# Patient Record
Sex: FEMALE | Race: WHITE | NOT HISPANIC OR LATINO | Employment: FULL TIME | URBAN - METROPOLITAN AREA
[De-identification: names, ages, dates, MRNs, and addresses within clinical notes are randomized per-mention and may not be internally consistent; named-entity substitution may affect disease eponyms.]

---

## 2015-04-30 LAB — HIV 1&2 EXT: NORMAL

## 2021-06-04 ENCOUNTER — IMMUNIZATION (OUTPATIENT)
Dept: LAB | Facility: CLINIC | Age: 56
End: 2021-06-04

## 2022-01-25 ENCOUNTER — TRANSFERRED RECORDS (OUTPATIENT)
Dept: MULTI SPECIALTY CLINIC | Facility: CLINIC | Age: 57
End: 2022-01-25

## 2022-01-25 LAB
C TRACH DNA SPEC QL PROBE+SIG AMP: NEGATIVE
CHOLESTEROL (EXTERNAL): 274 MG/DL (ref 100–199)
GLUCOSE (EXTERNAL): 121 MG/DL (ref 65–100)
HDLC SERPL-MCNC: 51 MG/DL
HPV ABSTRACT: NORMAL
LDL CHOLESTEROL (EXTERNAL): 180 MG/DL
N GONORRHOEA DNA SPEC QL PROBE+SIG AMP: NEGATIVE
NON HDL CHOLESTEROL (EXTERNAL): 223 MG/DL
PAP-ABSTRACT: NORMAL
SPECIMEN DESCRIP: NORMAL
SPECIMEN DESCRIPTION: NORMAL
TRIGLYCERIDES (EXTERNAL): 213 MG/DL

## 2022-02-01 ENCOUNTER — OFFICE VISIT (OUTPATIENT)
Dept: DERMATOLOGY | Facility: CLINIC | Age: 57
End: 2022-02-01
Payer: COMMERCIAL

## 2022-02-01 VITALS — DIASTOLIC BLOOD PRESSURE: 88 MMHG | OXYGEN SATURATION: 94 % | SYSTOLIC BLOOD PRESSURE: 129 MMHG | HEART RATE: 93 BPM

## 2022-02-01 DIAGNOSIS — L92.0 GRANULOMA ANNULARE: ICD-10-CM

## 2022-02-01 DIAGNOSIS — D18.01 CHERRY ANGIOMA: ICD-10-CM

## 2022-02-01 DIAGNOSIS — L82.1 SEBORRHEIC KERATOSIS: ICD-10-CM

## 2022-02-01 DIAGNOSIS — D48.5 NEOPLASM OF UNCERTAIN BEHAVIOR OF SKIN: ICD-10-CM

## 2022-02-01 DIAGNOSIS — D17.21 LIPOMA OF RIGHT UPPER EXTREMITY: ICD-10-CM

## 2022-02-01 DIAGNOSIS — L81.4 LENTIGO: Primary | ICD-10-CM

## 2022-02-01 DIAGNOSIS — D22.9 MULTIPLE BENIGN NEVI: ICD-10-CM

## 2022-02-01 PROCEDURE — 99203 OFFICE O/P NEW LOW 30 MIN: CPT | Mod: 25 | Performed by: PHYSICIAN ASSISTANT

## 2022-02-01 PROCEDURE — 11103 TANGNTL BX SKIN EA SEP/ADDL: CPT | Performed by: PHYSICIAN ASSISTANT

## 2022-02-01 PROCEDURE — 11102 TANGNTL BX SKIN SINGLE LES: CPT | Performed by: PHYSICIAN ASSISTANT

## 2022-02-01 PROCEDURE — 88305 TISSUE EXAM BY PATHOLOGIST: CPT | Performed by: DERMATOLOGY

## 2022-02-01 RX ORDER — METRONIDAZOLE 500 MG/1
500 TABLET ORAL
COMMUNITY
Start: 2022-01-27 | End: 2022-02-03

## 2022-02-01 RX ORDER — HYDROCODONE BITARTRATE AND ACETAMINOPHEN 5; 325 MG/1; MG/1
TABLET ORAL
COMMUNITY
Start: 2021-11-24 | End: 2022-12-21

## 2022-02-01 RX ORDER — SUMATRIPTAN 100 MG/1
TABLET, FILM COATED ORAL
COMMUNITY
Start: 2022-01-21

## 2022-02-01 RX ORDER — GABAPENTIN 100 MG/1
1 CAPSULE ORAL AT BEDTIME
COMMUNITY
Start: 2021-12-12 | End: 2022-12-21

## 2022-02-01 RX ORDER — MELATONIN 10 MG
CAPSULE ORAL AT BEDTIME
COMMUNITY
Start: 2021-04-27 | End: 2022-09-20

## 2022-02-01 NOTE — NURSING NOTE
Chief Complaint   Patient presents with     Skin Check     spot on Left dorsal foot       Vitals:    02/01/22 1149   BP: 129/88   BP Location: Right arm   Patient Position: Sitting   Cuff Size: Adult Large   Pulse: 93   SpO2: 94%     Wt Readings from Last 1 Encounters:   09/16/13 (!) 142.4 kg (314 lb)       Leticia Garcia LPN .................2/1/2022

## 2022-02-01 NOTE — PROGRESS NOTES
Ruthy Hugo is an extremely pleasant 56 year old year old female patient here today for spot on right shoulder. Present this past year. No pain or bleeding. She also notes itchy spot on upper back.  Patient has no other skin complaints today.  Remainder of the HPI, Meds, PMH, Allergies, FH, and SH was reviewed in chart.    Pertinent Hx:   No personal history of skin cancer.   History reviewed. No pertinent past medical history.    Past Surgical History:   Procedure Laterality Date     ARTHROPLASTY KNEE  9/16/2013    Procedure: ARTHROPLASTY KNEE;  Right Total Knee Arthroplasty;  Surgeon: Gabe Hassan MD;  Location: [facility]     ARTHROPLASTY KNEE  11/4/2013    Procedure: ARTHROPLASTY KNEE;  Left total knee arthroplasty;  Surgeon: Gabe Hassan MD;  Location: [facility]        Family History   Problem Relation Age of Onset     Melanoma Maternal Grandfather        Social History     Socioeconomic History     Marital status:      Spouse name: Not on file     Number of children: Not on file     Years of education: Not on file     Highest education level: Not on file   Occupational History     Not on file   Tobacco Use     Smoking status: Never Smoker     Smokeless tobacco: Never Used   Substance and Sexual Activity     Alcohol use: Not on file     Drug use: Not on file     Sexual activity: Not on file   Other Topics Concern     Not on file   Social History Narrative     Not on file     Social Determinants of Health     Financial Resource Strain: Not on file   Food Insecurity: Not on file   Transportation Needs: Not on file   Physical Activity: Not on file   Stress: Not on file   Social Connections: Not on file   Intimate Partner Violence: Not on file   Housing Stability: Not on file       Outpatient Encounter Medications as of 2/1/2022   Medication Sig Dispense Refill     BuPROPion HCl (WELLBUTRIN SR PO) Take 300 mg by mouth every morning       BuPROPion HCl (WELLBUTRIN SR PO) Take 150 mg by mouth  every evening       Calcium Carbonate (CALCIUM 600 PO) Take 1 tablet by mouth 2 times daily        calcium carbonate (TUMS) 500 MG chewable tablet Take 1-2 chew tab by mouth as needed For heartburn/reflux       cimetidine (TAGAMET) 200 MG tablet Take by mouth as needed       clindamycin (CLEOCIN T) 1 % external solution Apply topically 2 times daily as needed        coenzyme Q-10 75 MG CAPS Patient reports taking 200 mg softgel       CYCLOBENZAPRINE HCL PO Take 10 mg by mouth 3 times daily as needed Take one tablet by mouth three times daily       DOCUSATE SODIUM PO Take 300 mg by mouth every evening       gabapentin (NEURONTIN) 100 MG capsule Take 1 capsule by mouth At Bedtime       GLUCOSAMINE SULFATE PO Take 1,500 mg by mouth 2 times daily for joints       HYDROcodone-acetaminophen (NORCO) 5-325 MG tablet TAKE 1-2 TABLETS BY MOUTH EVERY 6 HOURS AS NEEDED FOR PAIN Maximum of 2 per 24 hours       Melatonin 10 MG CAPS Take by mouth At Bedtime       metroNIDAZOLE (FLAGYL) 500 MG tablet Take 500 mg by mouth For 7 days       psyllium 0.52 G capsule Take 2 capsules by mouth 2 times daily       SIMVASTATIN PO Take 20 mg by mouth At Bedtime       SUMAtriptan (IMITREX) 100 MG tablet Take 0.5 Tablets (50 mg) by mouth one time if needed for Migraine. May repeat in 2 hrs. Maximum of 2 in 24 hours       DiphenhydrAMINE HCl (BENADRYL PO) Take 50 mg by mouth every morning  (Patient not taking: Reported on 2/1/2022)       ferrous sulfate 325 (65 FE) MG tablet Take 325 mg by mouth daily (with breakfast) (Patient not taking: Reported on 2/1/2022)       hydrOXYzine (ATARAX) 25 MG tablet Take 1-2 tablets (25-50 mg) by mouth every 6 hours as needed (adjuvant pain) (Patient not taking: Reported on 2/1/2022) 30 tablet 1     Multiple Vitamin (MULTI-VITAMIN DAILY PO) Take 1 tablet by mouth daily  (Patient not taking: Reported on 2/1/2022)       oxyCODONE-acetaminophen (PERCOCET) 5-325 MG per tablet Take 1-2 tablets by mouth every 4 hours  as needed (Patient not taking: Reported on 2/1/2022) 60 tablet 0     polyethylene glycol (MIRALAX/GLYCOLAX) packet Take 17 g by mouth daily (Patient not taking: Reported on 2/1/2022) 7 packet 0     Solifenacin Succinate (VESICARE PO) Take 10 mg by mouth every morning (Patient not taking: Reported on 2/1/2022)       warfarin (COUMADIN) 5 MG tablet Starting Friday, 11/8 take 1.5 tablets (7.5mg) daily. Adjust dosing as directed by clinic. (Patient not taking: Reported on 2/1/2022) 45 tablet 1     No facility-administered encounter medications on file as of 2/1/2022.             O:   NAD, WDWN, Alert & Oriented, Mood & Affect wnl, Vitals stable   Here today alone   /88 (BP Location: Right arm, Patient Position: Sitting, Cuff Size: Adult Large)   Pulse 93   SpO2 94%    General appearance normal   Vitals stable   Alert, oriented and in no acute distress     Soft subcutaneous nodule on right forearm, right posterior shoulder, left upper back  0.2 cm light brown papule with darker center on right posterior shoulder   0.4 cm pink scabbed papule on right upper back   Stuck on papules and brown macules on trunk and ext   Slightly annulare pink patch with center clearance on left dorsal foot   Red papules on trunk  Brown papules and macules with regular pigment network and borders on torso and extremities      The remainder of skin exam is normal     Eyes: Conjunctivae/lids:Normal     ENT: Lips: normal    MSK:Normal    Cardiovascular: peripheral edema none    Pulm: Breathing Normal    Neuro/Psych: Orientation:Alert and Orientedx3 ; Mood/Affect:normal     A/P:  1. R/O atypical nevus on right posterior shoulder  TANGENTIAL BIOPSY SENT OUT:  After consent, anesthesia with LEC and prep, tangential excision performed and specimen sent out for permanent section histology.  No complications and routine wound care. Patient told to call our office in 1-2 weeks for result.      2. R/O irritated nevus on right upper  back  TANGENTIAL BIOPSY SENT OUT:  After consent, anesthesia with LEC and prep, tangential excision performed and specimen sent out for permanent section histology.  No complications and routine wound care. Patient told to call our office in 1-2 weeks for result.      3. 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   4. Seborrheic keratosis, lentigo, angioma, benign nevi, lipoma   BENIGN LESIONS DISCUSSED WITH PATIENT:  I discussed the specifics of tumor, prognosis, and genetics of benign lesions.  I explained that treatment of these lesions would be purely cosmetic and not medically neccessary.  I discussed with patient different removal options including excision, cautery and /or laser.      Nature and genetics of benign skin lesions dicussed with patient.  Signs and Symptoms of skin cancer discussed with patient.  ABCDEs of melanoma reviewed with patient.  Patient encouraged to perform monthly skin exams.  UV precautions reviewed with patient.  Risks of non-melanoma skin cancer discussed with patient   Return to clinic pending biopsy results.

## 2022-02-01 NOTE — PATIENT INSTRUCTIONS
Wound Care Instructions     FOR SUPERFICIAL WOUNDS     AFTER 24 HOURS YOU SHOULD REMOVE THE BANDAGE AND BEGIN DAILY DRESSING CHANGES AS FOLLOWS:     1) Remove Dressing.     2) Clean and dry the area with tap water using a Q-tip or sterile gauze pad.     3) Apply Polysporin ointment, vaseline, aquaphor or Bacitracin ointment over entire wound.  Do NOT use Neosporin ointment.     4) Cover the wound with a band-aid, or a sterile non-stick gauze pad and micropore paper tape      REPEAT THESE INSTRUCTIONS AT LEAST ONCE A DAY UNTIL THE WOUND HAS COMPLETELY HEALED.    It is an old wives tale that a wound heals better when it is exposed to air and allowed to dry out. The wound will heal faster with a better cosmetic result if it is kept moist with ointment and covered with a bandage.    **Do not let the wound dry out.**      Supplies Needed:      *Cotton tipped applicators (Q-tips)    *Polysporin Ointment or Bacitracin Ointment (NOT NEOSPORIN)    *Band-aids or non-stick gauze pads and micropore paper tape.    PATIENT INFORMATION:    During the healing process you will notice a number of changes. All wounds develop a small halo of redness surrounding the wound.  This means healing is occurring. Severe itching with extensive redness usually indicates sensitivity to the ointment or bandage tape used to dress the wound.  You should call our office if this develops.      Swelling  and/or discoloration around your surgical site is common, particularly when performed around the eye.    All wounds normally drain.  The larger the wound the more drainage there will be.  After 7-10 days, you will notice the wound beginning to shrink and new skin will begin to grow.  The wound is healed when you can see skin has formed over the entire area.  A healed wound has a healthy, shiny look to the surface and is red to dark pink in color to normalize.  Wounds may take approximately 4-6 weeks to heal.  Larger wounds may take 6-8 weeks.  After  the wound is healed you may discontinue dressing changes.    You may experience a sensation of tightness as your wound heals. This is normal and will gradually subside.    Your healed wound may be sensitive to temperature changes. This sensitivity improves with time, but if you re having a lot of discomfort, try to avoid temperature extremes.    Patients frequently experience itching after their wound appears to have healed because of the continue healing under the skin.  Plain Vaseline will help relieve the itching.    BLEEDIN. Leave the bandage in place.  2. Use tightly rolled up gauze or a cloth to apply direct pressure over the bandage for 20 minutes.  3. Reapply pressure for an additional 20 minutes if necessary  4. Call the office or go to the nearest emergency room if pressure fails to stop the bleeding.  5. Use additional gauze and tape to maintain pressure once the bleeding has stopped.  6. If pressure alone does not stop the bleeding, call the Dermatology Clinic at [phone] or go to the nearest Emergency room.

## 2022-02-01 NOTE — LETTER
2/1/2022         RE: Ruthy Hugo  [address]  [place] [address]        Dear Colleague,    Thank you for referring your patient, Ruthy Hugo, to the [facility]. Please see a copy of my visit note below.    Ruthy Hugo is an extremely pleasant 56 year old year old female patient here today for spot on right shoulder. Present this past year. No pain or bleeding. She also notes itchy spot on upper back.  Patient has no other skin complaints today.  Remainder of the HPI, Meds, PMH, Allergies, FH, and SH was reviewed in chart.    Pertinent Hx:   No personal history of skin cancer.   History reviewed. No pertinent past medical history.    Past Surgical History:   Procedure Laterality Date     ARTHROPLASTY KNEE  9/16/2013    Procedure: ARTHROPLASTY KNEE;  Right Total Knee Arthroplasty;  Surgeon: Gabe Hassan MD;  Location: [facility]     ARTHROPLASTY KNEE  11/4/2013    Procedure: ARTHROPLASTY KNEE;  Left total knee arthroplasty;  Surgeon: Gabe Hassan MD;  Location: [facility]        Family History   Problem Relation Age of Onset     Melanoma Maternal Grandfather        Social History     Socioeconomic History     Marital status:      Spouse name: Not on file     Number of children: Not on file     Years of education: Not on file     Highest education level: Not on file   Occupational History     Not on file   Tobacco Use     Smoking status: Never Smoker     Smokeless tobacco: Never Used   Substance and Sexual Activity     Alcohol use: Not on file     Drug use: Not on file     Sexual activity: Not on file   Other Topics Concern     Not on file   Social History Narrative     Not on file     Social Determinants of Health     Financial Resource Strain: Not on file   Food Insecurity: Not on file   Transportation Needs: Not on file   Physical Activity: Not on file   Stress: Not on file   Social Connections: Not on file   Intimate Partner Violence: Not on file    Housing Stability: Not on file       Outpatient Encounter Medications as of 2/1/2022   Medication Sig Dispense Refill     BuPROPion HCl (WELLBUTRIN SR PO) Take 300 mg by mouth every morning       BuPROPion HCl (WELLBUTRIN SR PO) Take 150 mg by mouth every evening       Calcium Carbonate (CALCIUM 600 PO) Take 1 tablet by mouth 2 times daily        calcium carbonate (TUMS) 500 MG chewable tablet Take 1-2 chew tab by mouth as needed For heartburn/reflux       cimetidine (TAGAMET) 200 MG tablet Take by mouth as needed       clindamycin (CLEOCIN T) 1 % external solution Apply topically 2 times daily as needed        coenzyme Q-10 75 MG CAPS Patient reports taking 200 mg softgel       CYCLOBENZAPRINE HCL PO Take 10 mg by mouth 3 times daily as needed Take one tablet by mouth three times daily       DOCUSATE SODIUM PO Take 300 mg by mouth every evening       gabapentin (NEURONTIN) 100 MG capsule Take 1 capsule by mouth At Bedtime       GLUCOSAMINE SULFATE PO Take 1,500 mg by mouth 2 times daily for joints       HYDROcodone-acetaminophen (NORCO) 5-325 MG tablet TAKE 1-2 TABLETS BY MOUTH EVERY 6 HOURS AS NEEDED FOR PAIN Maximum of 2 per 24 hours       Melatonin 10 MG CAPS Take by mouth At Bedtime       metroNIDAZOLE (FLAGYL) 500 MG tablet Take 500 mg by mouth For 7 days       psyllium 0.52 G capsule Take 2 capsules by mouth 2 times daily       SIMVASTATIN PO Take 20 mg by mouth At Bedtime       SUMAtriptan (IMITREX) 100 MG tablet Take 0.5 Tablets (50 mg) by mouth one time if needed for Migraine. May repeat in 2 hrs. Maximum of 2 in 24 hours       DiphenhydrAMINE HCl (BENADRYL PO) Take 50 mg by mouth every morning  (Patient not taking: Reported on 2/1/2022)       ferrous sulfate 325 (65 FE) MG tablet Take 325 mg by mouth daily (with breakfast) (Patient not taking: Reported on 2/1/2022)       hydrOXYzine (ATARAX) 25 MG tablet Take 1-2 tablets (25-50 mg) by mouth every 6 hours as needed (adjuvant pain) (Patient not taking:  Reported on 2/1/2022) 30 tablet 1     Multiple Vitamin (MULTI-VITAMIN DAILY PO) Take 1 tablet by mouth daily  (Patient not taking: Reported on 2/1/2022)       oxyCODONE-acetaminophen (PERCOCET) 5-325 MG per tablet Take 1-2 tablets by mouth every 4 hours as needed (Patient not taking: Reported on 2/1/2022) 60 tablet 0     polyethylene glycol (MIRALAX/GLYCOLAX) packet Take 17 g by mouth daily (Patient not taking: Reported on 2/1/2022) 7 packet 0     Solifenacin Succinate (VESICARE PO) Take 10 mg by mouth every morning (Patient not taking: Reported on 2/1/2022)       warfarin (COUMADIN) 5 MG tablet Starting Friday, 11/8 take 1.5 tablets (7.5mg) daily. Adjust dosing as directed by clinic. (Patient not taking: Reported on 2/1/2022) 45 tablet 1     No facility-administered encounter medications on file as of 2/1/2022.             O:   NAD, WDWN, Alert & Oriented, Mood & Affect wnl, Vitals stable   Here today alone   /88 (BP Location: Right arm, Patient Position: Sitting, Cuff Size: Adult Large)   Pulse 93   SpO2 94%    General appearance normal   Vitals stable   Alert, oriented and in no acute distress     Soft subcutaneous nodule on right forearm, right posterior shoulder, left upper back  0.2 cm light brown papule with darker center on right posterior shoulder   0.4 cm pink scabbed papule on right upper back   Stuck on papules and brown macules on trunk and ext   Slightly annulare pink patch with center clearance on left dorsal foot   Red papules on trunk  Brown papules and macules with regular pigment network and borders on torso and extremities      The remainder of skin exam is normal     Eyes: Conjunctivae/lids:Normal     ENT: Lips: normal    MSK:Normal    Cardiovascular: peripheral edema none    Pulm: Breathing Normal    Neuro/Psych: Orientation:Alert and Orientedx3 ; Mood/Affect:normal     A/P:  1. R/O atypical nevus on right posterior shoulder  TANGENTIAL BIOPSY SENT OUT:  After consent, anesthesia with  LEC and prep, tangential excision performed and specimen sent out for permanent section histology.  No complications and routine wound care. Patient told to call our office in 1-2 weeks for result.      2. R/O irritated nevus on right upper back  TANGENTIAL BIOPSY SENT OUT:  After consent, anesthesia with LEC and prep, tangential excision performed and specimen sent out for permanent section histology.  No complications and routine wound care. Patient told to call our office in 1-2 weeks for result.      3. 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   4. Seborrheic keratosis, lentigo, angioma, benign nevi, lipoma   BENIGN LESIONS DISCUSSED WITH PATIENT:  I discussed the specifics of tumor, prognosis, and genetics of benign lesions.  I explained that treatment of these lesions would be purely cosmetic and not medically neccessary.  I discussed with patient different removal options including excision, cautery and /or laser.      Nature and genetics of benign skin lesions dicussed with patient.  Signs and Symptoms of skin cancer discussed with patient.  ABCDEs of melanoma reviewed with patient.  Patient encouraged to perform monthly skin exams.  UV precautions reviewed with patient.  Risks of non-melanoma skin cancer discussed with patient   Return to clinic pending biopsy results.       Again, thank you for allowing me to participate in the care of your patient.        Sincerely,        Kaycee Cortez PA-C

## 2022-02-02 LAB
PATH REPORT.COMMENTS IMP SPEC: NORMAL
PATH REPORT.COMMENTS IMP SPEC: NORMAL
PATH REPORT.FINAL DX SPEC: NORMAL
PATH REPORT.GROSS SPEC: NORMAL
PATH REPORT.MICROSCOPIC SPEC OTHER STN: NORMAL
PATH REPORT.RELEVANT HX SPEC: NORMAL

## 2022-03-12 ENCOUNTER — HEALTH MAINTENANCE LETTER (OUTPATIENT)
Age: 57
End: 2022-03-12

## 2022-03-21 ENCOUNTER — ANCILLARY PROCEDURE (OUTPATIENT)
Dept: GENERAL RADIOLOGY | Facility: CLINIC | Age: 57
End: 2022-03-21
Attending: PODIATRIST
Payer: COMMERCIAL

## 2022-03-21 ENCOUNTER — OFFICE VISIT (OUTPATIENT)
Dept: PODIATRY | Facility: CLINIC | Age: 57
End: 2022-03-21
Payer: COMMERCIAL

## 2022-03-21 VITALS
DIASTOLIC BLOOD PRESSURE: 88 MMHG | HEIGHT: 69 IN | BODY MASS INDEX: 43.4 KG/M2 | SYSTOLIC BLOOD PRESSURE: 142 MMHG | WEIGHT: 293 LBS | HEART RATE: 103 BPM

## 2022-03-21 DIAGNOSIS — M84.375A STRESS FRACTURE OF METATARSAL BONE OF LEFT FOOT, INITIAL ENCOUNTER: ICD-10-CM

## 2022-03-21 DIAGNOSIS — M79.672 LEFT FOOT PAIN: ICD-10-CM

## 2022-03-21 DIAGNOSIS — M79.672 LEFT FOOT PAIN: Primary | ICD-10-CM

## 2022-03-21 PROCEDURE — 99203 OFFICE O/P NEW LOW 30 MIN: CPT | Performed by: PODIATRIST

## 2022-03-21 PROCEDURE — 73630 X-RAY EXAM OF FOOT: CPT | Mod: LT | Performed by: RADIOLOGY

## 2022-03-21 ASSESSMENT — PAIN SCALES - GENERAL: PAINLEVEL: MILD PAIN (3)

## 2022-03-21 NOTE — LETTER
3/21/2022         RE: Ruthy Hugo  [address]  [place] [address]        Dear Colleague,    Thank you for referring your patient, Ruthy Hugo, to the [facility]. Please see a copy of my visit note below.    PATIENT HISTORY:  Ruthy Hugo is a 56 year old female who presents to clinic as a self referral with a chief complaint of left foot pain.  The patient is seen by themselves.  The patient relates the pain is primarily located around the top of foot into arch wrapping into the bottom.  Surgery in 2010 that has been going on for on and off last 2  year(s).  The patient has previously tried new shoes with little relief.  Prior history of similar pain/issues ~ 12 years ago..  The patient is currently employed as -vet tech.  Any previous notes and studies that pertain to the patient's condition were reviewed.    Pertinent medical, surgical and family history was reviewed in the Epic chart and include osteoarthritis of the right knee s/p total knee arthroplasty right and left    Medications:   Current Outpatient Medications:      BuPROPion HCl (WELLBUTRIN SR PO), Take 300 mg by mouth every morning, Disp: , Rfl:      BuPROPion HCl (WELLBUTRIN SR PO), Take 150 mg by mouth every evening, Disp: , Rfl:      Calcium Carbonate (CALCIUM 600 PO), Take 1 tablet by mouth 2 times daily , Disp: , Rfl:      calcium carbonate (TUMS) 500 MG chewable tablet, Take 1-2 chew tab by mouth as needed For heartburn/reflux, Disp: , Rfl:      cimetidine (TAGAMET) 200 MG tablet, Take by mouth as needed, Disp: , Rfl:      clindamycin (CLEOCIN T) 1 % external solution, Apply topically 2 times daily as needed , Disp: , Rfl:      coenzyme Q-10 75 MG CAPS, Patient reports taking 200 mg softgel, Disp: , Rfl:      CYCLOBENZAPRINE HCL PO, Take 10 mg by mouth 3 times daily as needed Take one tablet by mouth three times daily, Disp: , Rfl:      DOCUSATE SODIUM PO, Take 300 mg by  mouth every evening, Disp: , Rfl:      gabapentin (NEURONTIN) 100 MG capsule, Take 1 capsule by mouth At Bedtime, Disp: , Rfl:      GLUCOSAMINE SULFATE PO, Take 1,500 mg by mouth 2 times daily for joints, Disp: , Rfl:      HYDROcodone-acetaminophen (NORCO) 5-325 MG tablet, TAKE 1-2 TABLETS BY MOUTH EVERY 6 HOURS AS NEEDED FOR PAIN Maximum of 2 per 24 hours, Disp: , Rfl:      Melatonin 10 MG CAPS, Take by mouth At Bedtime, Disp: , Rfl:      psyllium 0.52 G capsule, Take 2 capsules by mouth 2 times daily, Disp: , Rfl:      SIMVASTATIN PO, Take 20 mg by mouth At Bedtime, Disp: , Rfl:      SUMAtriptan (IMITREX) 100 MG tablet, Take 0.5 Tablets (50 mg) by mouth one time if needed for Migraine. May repeat in 2 hrs. Maximum of 2 in 24 hours, Disp: , Rfl:      DiphenhydrAMINE HCl (BENADRYL PO), Take 50 mg by mouth every morning  (Patient not taking: Reported on 2/1/2022), Disp: , Rfl:      ferrous sulfate 325 (65 FE) MG tablet, Take 325 mg by mouth daily (with breakfast) (Patient not taking: Reported on 2/1/2022), Disp: , Rfl:      hydrOXYzine (ATARAX) 25 MG tablet, Take 1-2 tablets (25-50 mg) by mouth every 6 hours as needed (adjuvant pain) (Patient not taking: Reported on 2/1/2022), Disp: 30 tablet, Rfl: 1     Multiple Vitamin (MULTI-VITAMIN DAILY PO), Take 1 tablet by mouth daily  (Patient not taking: Reported on 2/1/2022), Disp: , Rfl:      oxyCODONE-acetaminophen (PERCOCET) 5-325 MG per tablet, Take 1-2 tablets by mouth every 4 hours as needed (Patient not taking: Reported on 2/1/2022), Disp: 60 tablet, Rfl: 0     polyethylene glycol (MIRALAX/GLYCOLAX) packet, Take 17 g by mouth daily (Patient not taking: Reported on 2/1/2022), Disp: 7 packet, Rfl: 0     Solifenacin Succinate (VESICARE PO), Take 10 mg by mouth every morning (Patient not taking: Reported on 2/1/2022), Disp: , Rfl:      warfarin (COUMADIN) 5 MG tablet, Starting Friday, 11/8 take 1.5 tablets (7.5mg) daily. Adjust dosing as directed by clinic. (Patient not  "taking: Reported on 2/1/2022), Disp: 45 tablet, Rfl: 1     Allergies:    Allergies   Allergen Reactions     Cats Hives and Itching     sneezing     Dogs Hives and Itching     sneezing       Vitals: BP (!) 142/88   Pulse 103   Ht 1.753 m (5' 9\")   Wt 142.4 kg (314 lb)   BMI 46.37 kg/m    BMI= Body mass index is 46.37 kg/m .    LOWER EXTREMITY PHYSICAL EXAM    Dermatologic: Skin is intact to left lower extremity without significant lesions, rash or abrasion.        Vascular: DP & PT pulses are intact & regular on the left.   CFT and skin temperature is normal to the left lower extremity.     Neurologic: Lower extremity sensation is intact to light touch.  No evidence of weakness in the left lower extremity.        Musculoskeletal: Patient is ambulatory without assistive device or brace.  No gross ankle deformity noted.  No foot or ankle joint effusion is noted.  Noted pain on palpation over the lateral aspect of the foot.  No surrounding erythema or ecchymosis.    Diagnostics:  Radiographs included three views of the left foot demonstrating evidence of a stress fracture to the proximal metaphyseal / diaphyseal junction of the fifth metatarsal.  Noted cortical thickening of the fourth metatarsal.  Noted metatarsus adductus.  No cortical erosions or periosteal elevation.  All joint margins appear stable.  There is no apparent fracture or tumor formation noted.  There is no evidence of foreign body.  The images were independently reviewed by myself along with the patient explaining the findings.      ASSESSMENT / PLAN:     ICD-10-CM    1. Left foot pain  M79.672 XR Foot Left G/E 3 Views   2. Stress fracture of metatarsal bone of left foot, initial encounter  M84.375A Ankle/Foot Bracing Supplies DME     Orthotics and Prosthetics DME       I have explained to Ruthy about the conditions.  We discussed the underlying contributing factors to the condition as well as treatment options along with expected length of " recovery.  At this time, the patient was educated on the importance of offloading supportive shoes and other devices.  I demonstrated to the patient calf stretches to perform every hour daily until symptoms resolve.  After symptoms resolve, the patient was advised to perform the stretches 3 times daily to prevent future recurrence.  The patient was instructed to perform warm soaks with Epson salt after which to also apply over-the-counter Voltaren gel to deeply massage the injured tissue.  The patient was instructed to do this on a daily basis until symptoms resolve.  The patient was fitted with a short CAM boot that will aid in offloading the tension forces to the soft tissues and prevent further inflammation.  The patient was referred to [facility] for custom orthotics that will aid in offloading the tension forces to the soft tissues and prevent further inflammation after recovering from the current fracture.  The patient will return in four weeks for reevaluation and repeat x-rays.      Ruthy verbalized agreement with and understanding of the rational for the diagnosis and treatment plan.  All questions were answered to best of my ability and the patient's satisfaction. The patient was advised to contact the clinic with any questions that may arise after the clinic visit.      Disclaimer: This note consists of symbols derived from keyboarding, dictation and/or voice recognition software. As a result, there may be errors in the script that have gone undetected. Please consider this when interpreting information found in this chart.       TUCKER Rasmussen.P.MUSA., F.A.C.F.A.S.        Again, thank you for allowing me to participate in the care of your patient.        Sincerely,        Suhas Reyes DPM

## 2022-03-21 NOTE — NURSING NOTE
"Chief Complaint   Patient presents with     Consult     left foot pain-looking for new shoes       Initial BP (!) 142/88   Pulse 103   Ht 1.753 m (5' 9\")   Wt 142.4 kg (314 lb)   BMI 46.37 kg/m   Estimated body mass index is 46.37 kg/m  as calculated from the following:    Height as of this encounter: 1.753 m (5' 9\").    Weight as of this encounter: 142.4 kg (314 lb).  Medications and allergies reviewed.      Jennifer CEBALLOS MA    "

## 2022-03-21 NOTE — PROGRESS NOTES
PATIENT HISTORY:  Ruthy Hugo is a 56 year old female who presents to clinic as a self referral with a chief complaint of left foot pain.  The patient is seen by themselves.  The patient relates the pain is primarily located around the top of foot into arch wrapping into the bottom.  Surgery in 2010 that has been going on for on and off last 2  year(s).  The patient has previously tried new shoes with little relief.  Prior history of similar pain/issues ~ 12 years ago..  The patient is currently employed as -vet tech.  Any previous notes and studies that pertain to the patient's condition were reviewed.    Pertinent medical, surgical and family history was reviewed in the Epic chart and include osteoarthritis of the right knee s/p total knee arthroplasty right and left    Medications:   Current Outpatient Medications:      BuPROPion HCl (WELLBUTRIN SR PO), Take 300 mg by mouth every morning, Disp: , Rfl:      BuPROPion HCl (WELLBUTRIN SR PO), Take 150 mg by mouth every evening, Disp: , Rfl:      Calcium Carbonate (CALCIUM 600 PO), Take 1 tablet by mouth 2 times daily , Disp: , Rfl:      calcium carbonate (TUMS) 500 MG chewable tablet, Take 1-2 chew tab by mouth as needed For heartburn/reflux, Disp: , Rfl:      cimetidine (TAGAMET) 200 MG tablet, Take by mouth as needed, Disp: , Rfl:      clindamycin (CLEOCIN T) 1 % external solution, Apply topically 2 times daily as needed , Disp: , Rfl:      coenzyme Q-10 75 MG CAPS, Patient reports taking 200 mg softgel, Disp: , Rfl:      CYCLOBENZAPRINE HCL PO, Take 10 mg by mouth 3 times daily as needed Take one tablet by mouth three times daily, Disp: , Rfl:      DOCUSATE SODIUM PO, Take 300 mg by mouth every evening, Disp: , Rfl:      gabapentin (NEURONTIN) 100 MG capsule, Take 1 capsule by mouth At Bedtime, Disp: , Rfl:      GLUCOSAMINE SULFATE PO, Take 1,500 mg by mouth 2 times daily for joints, Disp: , Rfl:      HYDROcodone-acetaminophen (NORCO) 5-325  "MG tablet, TAKE 1-2 TABLETS BY MOUTH EVERY 6 HOURS AS NEEDED FOR PAIN Maximum of 2 per 24 hours, Disp: , Rfl:      Melatonin 10 MG CAPS, Take by mouth At Bedtime, Disp: , Rfl:      psyllium 0.52 G capsule, Take 2 capsules by mouth 2 times daily, Disp: , Rfl:      SIMVASTATIN PO, Take 20 mg by mouth At Bedtime, Disp: , Rfl:      SUMAtriptan (IMITREX) 100 MG tablet, Take 0.5 Tablets (50 mg) by mouth one time if needed for Migraine. May repeat in 2 hrs. Maximum of 2 in 24 hours, Disp: , Rfl:      DiphenhydrAMINE HCl (BENADRYL PO), Take 50 mg by mouth every morning  (Patient not taking: Reported on 2/1/2022), Disp: , Rfl:      ferrous sulfate 325 (65 FE) MG tablet, Take 325 mg by mouth daily (with breakfast) (Patient not taking: Reported on 2/1/2022), Disp: , Rfl:      hydrOXYzine (ATARAX) 25 MG tablet, Take 1-2 tablets (25-50 mg) by mouth every 6 hours as needed (adjuvant pain) (Patient not taking: Reported on 2/1/2022), Disp: 30 tablet, Rfl: 1     Multiple Vitamin (MULTI-VITAMIN DAILY PO), Take 1 tablet by mouth daily  (Patient not taking: Reported on 2/1/2022), Disp: , Rfl:      oxyCODONE-acetaminophen (PERCOCET) 5-325 MG per tablet, Take 1-2 tablets by mouth every 4 hours as needed (Patient not taking: Reported on 2/1/2022), Disp: 60 tablet, Rfl: 0     polyethylene glycol (MIRALAX/GLYCOLAX) packet, Take 17 g by mouth daily (Patient not taking: Reported on 2/1/2022), Disp: 7 packet, Rfl: 0     Solifenacin Succinate (VESICARE PO), Take 10 mg by mouth every morning (Patient not taking: Reported on 2/1/2022), Disp: , Rfl:      warfarin (COUMADIN) 5 MG tablet, Starting Friday, 11/8 take 1.5 tablets (7.5mg) daily. Adjust dosing as directed by clinic. (Patient not taking: Reported on 2/1/2022), Disp: 45 tablet, Rfl: 1     Allergies:    Allergies   Allergen Reactions     Cats Hives and Itching     sneezing     Dogs Hives and Itching     sneezing       Vitals: BP (!) 142/88   Pulse 103   Ht 1.753 m (5' 9\")   Wt 142.4 kg " (314 lb)   BMI 46.37 kg/m    BMI= Body mass index is 46.37 kg/m .    LOWER EXTREMITY PHYSICAL EXAM    Dermatologic: Skin is intact to left lower extremity without significant lesions, rash or abrasion.        Vascular: DP & PT pulses are intact & regular on the left.   CFT and skin temperature is normal to the left lower extremity.     Neurologic: Lower extremity sensation is intact to light touch.  No evidence of weakness in the left lower extremity.        Musculoskeletal: Patient is ambulatory without assistive device or brace.  No gross ankle deformity noted.  No foot or ankle joint effusion is noted.  Noted pain on palpation over the lateral aspect of the foot.  No surrounding erythema or ecchymosis.    Diagnostics:  Radiographs included three views of the left foot demonstrating evidence of a stress fracture to the proximal metaphyseal / diaphyseal junction of the fifth metatarsal.  Noted cortical thickening of the fourth metatarsal.  Noted metatarsus adductus.  No cortical erosions or periosteal elevation.  All joint margins appear stable.  There is no apparent fracture or tumor formation noted.  There is no evidence of foreign body.  The images were independently reviewed by myself along with the patient explaining the findings.      ASSESSMENT / PLAN:     ICD-10-CM    1. Left foot pain  M79.672 XR Foot Left G/E 3 Views   2. Stress fracture of metatarsal bone of left foot, initial encounter  M84.375A Ankle/Foot Bracing Supplies DME     Orthotics and Prosthetics DME       I have explained to Ruthy about the conditions.  We discussed the underlying contributing factors to the condition as well as treatment options along with expected length of recovery.  At this time, the patient was educated on the importance of offloading supportive shoes and other devices.  I demonstrated to the patient calf stretches to perform every hour daily until symptoms resolve.  After symptoms resolve, the patient was advised to  perform the stretches 3 times daily to prevent future recurrence.  The patient was instructed to perform warm soaks with Epson salt after which to also apply over-the-counter Voltaren gel to deeply massage the injured tissue.  The patient was instructed to do this on a daily basis until symptoms resolve.  The patient was fitted with a short CAM boot that will aid in offloading the tension forces to the soft tissues and prevent further inflammation.  The patient was referred to [facility] for custom orthotics that will aid in offloading the tension forces to the soft tissues and prevent further inflammation after recovering from the current fracture.  The patient will return in four weeks for reevaluation and repeat x-rays.      Ruthy verbalized agreement with and understanding of the rational for the diagnosis and treatment plan.  All questions were answered to best of my ability and the patient's satisfaction. The patient was advised to contact the clinic with any questions that may arise after the clinic visit.      Disclaimer: This note consists of symbols derived from keyboarding, dictation and/or voice recognition software. As a result, there may be errors in the script that have gone undetected. Please consider this when interpreting information found in this chart.       KAT Reyes D.P.M., JEROME.F.A.S.

## 2022-03-21 NOTE — PATIENT INSTRUCTIONS
Next Steps:      1. Support:  a. Wear supportive shoes, sandals, boots and/or inserts that have a rigid supportive sole.    i. This will offload the majority of tension forces that travel through your feet every step you take.    1. Skechers Max Cushioning Elite/Premier   2. Skechers Relax Fit D'Lux Walker  3. [employer]ebok Walk Ultra 7 DMX MAX   4. Hoka Bondi walking shoes  5. Superfeet inserts ([email or web address])  b. It is important that you also wear supportive shoe wear in the house to continue providing support to your feet.    c. You may always use a cushioned liner for your shoes if that makes your feet feel better.  2. Stretching  a. Calf stretching is essential to offload the tension forces that travel through your feet every step you take  b. Preferred calf stretch is the Runner's Stretch  i. Place one foot behind the other foot, flat against the ground (it is important to keep the heel on the ground).  The back leg is the one that will be stretched.  1. Start with the knee straight and lean your hips into the wall, counter or whatever you are leaning into - count to ten.  2. Next, bend the knee.  You should feel the stretch lower in the calf muscle - count to ten.  c. Repeat this stretch once an hour to start off with.  When symptoms subside, I recommend performing the stretch 3 times daily to prevent any future problems.                3. Tissue Massage  a. It is important that you physically loosen the inflammation tissue to help your body heal the injured tissue.  b. I recommend soaking your foot in warm water to increase the microcirculation to the soft tissues.  You may add Epson salt to the water if you prefer.  c. You may apply an over-the-counter muscle rub, such as Voltaren gel, and deeply massage the injured tissue.  4. Reduce Inflammation  a. You can ice the injured tissue with an ice pack with a light cloth covering or soaking in ice water 20 minutes to reduce any acute inflammation, typically at the  end of the day.  b. If tolerated, you may take a Non-Steroidal Antiinflammatory medication (NSAID), such as Advil or Aleve, to help reduce the inflammation tissue.  This can help the overall healing of the injured tissue.  i. It is important to take food with any NSAID medication as the most common side effect is stomach irritation.  If you encounter any problems when taking NSAID, it is recommended that you stop taking the medication and notify your provider.    It is important to understand that most problems that develop in the foot and ankle are caused by excessive tension that cause microinjury to the soft tissues and inflammation in the foot and ankle.  By addressing the underlying causes with support and stretching as well as treating the current inflammatory conditions with tissue massage and anti-inflammatory treatments, most foot and ankle musculoskeletal conditions will resolve.  This may take time to heal.  However, if symptoms persist past 4 weeks you should return to the office for reevaluation to determine further treatment options.

## 2022-06-01 ENCOUNTER — HOSPITAL ENCOUNTER (EMERGENCY)
Facility: CLINIC | Age: 57
Discharge: HOME | End: 2022-06-01
Attending: PHYSICIAN ASSISTANT | Admitting: PHYSICIAN ASSISTANT
Payer: COMMERCIAL

## 2022-06-01 VITALS
BODY MASS INDEX: 43.4 KG/M2 | DIASTOLIC BLOOD PRESSURE: 82 MMHG | HEIGHT: 69 IN | SYSTOLIC BLOOD PRESSURE: 134 MMHG | RESPIRATION RATE: 20 BRPM | OXYGEN SATURATION: 94 % | WEIGHT: 293 LBS | HEART RATE: 99 BPM | TEMPERATURE: 98 F

## 2022-06-01 DIAGNOSIS — U07.1 INFECTION DUE TO 2019 NOVEL CORONAVIRUS: ICD-10-CM

## 2022-06-01 DIAGNOSIS — Z20.822 EXPOSURE TO 2019 NOVEL CORONAVIRUS: ICD-10-CM

## 2022-06-01 LAB
FLUAV RNA SPEC QL NAA+PROBE: NEGATIVE
FLUBV RNA RESP QL NAA+PROBE: NEGATIVE
SARS-COV-2 RNA RESP QL NAA+PROBE: POSITIVE

## 2022-06-01 PROCEDURE — G0463 HOSPITAL OUTPT CLINIC VISIT: HCPCS | Mod: CS | Performed by: PHYSICIAN ASSISTANT

## 2022-06-01 PROCEDURE — C9803 HOPD COVID-19 SPEC COLLECT: HCPCS | Performed by: PHYSICIAN ASSISTANT

## 2022-06-01 PROCEDURE — 87636 SARSCOV2 & INF A&B AMP PRB: CPT | Performed by: PHYSICIAN ASSISTANT

## 2022-06-01 PROCEDURE — 99203 OFFICE O/P NEW LOW 30 MIN: CPT | Mod: CS | Performed by: PHYSICIAN ASSISTANT

## 2022-06-01 ASSESSMENT — ENCOUNTER SYMPTOMS
APPETITE CHANGE: 1
SINUS PRESSURE: 1
CHEST TIGHTNESS: 1
RHINORRHEA: 1
MYALGIAS: 1
TROUBLE SWALLOWING: 0
GASTROINTESTINAL NEGATIVE: 1
PSYCHIATRIC NEGATIVE: 1
SORE THROAT: 1
SHORTNESS OF BREATH: 1
ACTIVITY CHANGE: 1
CARDIOVASCULAR NEGATIVE: 1
WHEEZING: 0
FATIGUE: 1
COUGH: 1
NEUROLOGICAL NEGATIVE: 1
VOICE CHANGE: 0
PALPITATIONS: 0

## 2022-06-01 NOTE — ED PROVIDER NOTES
History     Chief Complaint   Patient presents with     Cough     Cough started Monday and has progressively gotten worse, started with congestion and fever last night into today. Reports fatigue and body aches     HPI  Ruthy Hugo is a 56 year old female who presents today with cough, chest congestion, shortness of breath, ear pain, sore throat, runny nose congestion, fatigue, and body aches that started 3 days ago.  Patient states this is very similar to when she had COVID in 2020.  She denies any chest pain, heart racing or skipping beats, abdominal pain, nausea or vomiting, diarrhea, rash, drainage from ears, headache or dizziness.  Patient states she took some over the counter cough medication and slept sitting upright last night which improved symptoms.  She has no concerns for cardiac issues and declines cardiac work-up today.  She denies any asthma, diabetes, heart disease, immune compromise symptoms.  She had the Parmjit & Parmjit COVID-vaccine but has not been boosted    Allergies:  Allergies   Allergen Reactions     Cats Hives and Itching     sneezing     Dogs Hives and Itching     sneezing       Problem List:    Patient Active Problem List    Diagnosis Date Noted     Status post total knee replacement LEFT 11/04/2013     Priority: Medium     Osteoarthritis 09/23/2013     Priority: Medium     Pain 09/23/2013     Priority: Medium     Advanced directives, counseling/discussion 09/23/2013     Priority: Medium     Depression 09/23/2013     Priority: Medium     Obesity 09/23/2013     Priority: Medium     S/P total knee arthroplasty RIGHT 09/16/2013     Priority: Medium        Past Medical History:    No past medical history on file.    Past Surgical History:    Past Surgical History:   Procedure Laterality Date     ARTHROPLASTY KNEE  9/16/2013    Procedure: ARTHROPLASTY KNEE;  Right Total Knee Arthroplasty;  Surgeon: Gabe Hassan MD;  Location: [facility]     ARTHROPLASTY KNEE  11/4/2013     Procedure: ARTHROPLASTY KNEE;  Left total knee arthroplasty;  Surgeon: Gabe Hassan MD;  Location: [facility]       Family History:    Family History   Problem Relation Age of Onset     Melanoma Maternal Grandfather        Social History:  Marital Status:   [4]  Social History     Tobacco Use     Smoking status: Never Smoker     Smokeless tobacco: Never Used        Medications:    molnupiravir (LAGEVRIO) 200 MG capsule  BuPROPion HCl (WELLBUTRIN SR PO)  BuPROPion HCl (WELLBUTRIN SR PO)  Calcium Carbonate (CALCIUM 600 PO)  calcium carbonate (TUMS) 500 MG chewable tablet  cimetidine (TAGAMET) 200 MG tablet  clindamycin (CLEOCIN T) 1 % external solution  coenzyme Q-10 75 MG CAPS  CYCLOBENZAPRINE HCL PO  DiphenhydrAMINE HCl (BENADRYL PO)  DOCUSATE SODIUM PO  ferrous sulfate 325 (65 FE) MG tablet  gabapentin (NEURONTIN) 100 MG capsule  GLUCOSAMINE SULFATE PO  HYDROcodone-acetaminophen (NORCO) 5-325 MG tablet  hydrOXYzine (ATARAX) 25 MG tablet  Melatonin 10 MG CAPS  Multiple Vitamin (MULTI-VITAMIN DAILY PO)  oxyCODONE-acetaminophen (PERCOCET) 5-325 MG per tablet  polyethylene glycol (MIRALAX/GLYCOLAX) packet  psyllium 0.52 G capsule  SIMVASTATIN PO  Solifenacin Succinate (VESICARE PO)  SUMAtriptan (IMITREX) 100 MG tablet  warfarin (COUMADIN) 5 MG tablet          Review of Systems   Constitutional: Positive for activity change, appetite change and fatigue.   HENT: Positive for congestion, ear pain, rhinorrhea, sinus pressure and sore throat. Negative for trouble swallowing and voice change.    Respiratory: Positive for cough, chest tightness and shortness of breath. Negative for wheezing.    Cardiovascular: Negative.  Negative for chest pain, palpitations and leg swelling.   Gastrointestinal: Negative.    Genitourinary: Negative.    Musculoskeletal: Positive for myalgias.        No leg pain or calf swelling per patient   Skin: Negative.    Neurological: Negative.    Psychiatric/Behavioral: Negative.    All  "other systems reviewed and are negative.      Physical Exam   BP: 134/82  Pulse: 99  Temp: 98  F (36.7  C)  Resp: 20  Height: 175.3 cm (5' 9\")  Weight: 140.6 kg (310 lb)  SpO2: 94 %      Physical Exam  Vitals and nursing note reviewed.   Constitutional:       General: She is not in acute distress.     Appearance: Normal appearance. She is normal weight. She is not toxic-appearing or diaphoretic.   HENT:      Right Ear: Tympanic membrane and ear canal normal.      Left Ear: Tympanic membrane and ear canal normal.      Nose: Congestion present.      Mouth/Throat:      Mouth: Mucous membranes are moist.      Pharynx: Oropharynx is clear. Posterior oropharyngeal erythema (minimal ) present. No oropharyngeal exudate.      Comments: Positive post nasal drainage  Eyes:      General: No scleral icterus.     Extraocular Movements: Extraocular movements intact.      Conjunctiva/sclera: Conjunctivae normal.      Pupils: Pupils are equal, round, and reactive to light.   Cardiovascular:      Rate and Rhythm: Normal rate and regular rhythm.      Heart sounds: Normal heart sounds.   Pulmonary:      Effort: Pulmonary effort is normal. No respiratory distress.      Breath sounds: Normal breath sounds. No stridor. No wheezing, rhonchi or rales.   Chest:      Chest wall: No tenderness.   Musculoskeletal:      Cervical back: Normal range of motion and neck supple. No rigidity or tenderness.   Lymphadenopathy:      Cervical: No cervical adenopathy.   Skin:     General: Skin is warm.      Capillary Refill: Capillary refill takes less than 2 seconds.   Neurological:      General: No focal deficit present.      Mental Status: She is alert and oriented to person, place, and time.   Psychiatric:         Mood and Affect: Mood normal.         Behavior: Behavior normal.         Thought Content: Thought content normal.         Judgment: Judgment normal.         ED Course                 Procedures             Critical Care time:  none           "     Results for orders placed or performed during the hospital encounter of 06/01/22 (from the past 24 hour(s))   Symptomatic; Yes; 5/30/2022 Influenza A/B & SARS-CoV2 (COVID-19) Virus PCR Multiplex Nasopharyngeal    Specimen: Nasopharyngeal; Swab   Result Value Ref Range    Influenza A PCR Negative Negative    Influenza B PCR Negative Negative    SARS CoV2 PCR Positive (A) Negative    Narrative    Testing was performed using the miguel SARS-CoV-2 & Influenza A/B Assay on the miguel Bella System. This test should be ordered for the detection of SARS-CoV-2 and influenza viruses in individuals who meet clinical and/or epidemiological criteria. Test performance is unknown in asymptomatic patients. This test is for in vitro diagnostic use under the FDA EUA for laboratories certified under CLIA to perform moderate and/or high complexity testing. This test has not been FDA cleared or approved. A negative result does not rule out the presence of PCR inhibitors in the specimen or target RNA in concentration below the limit of detection for the assay. If only one viral target is positive but coinfection with multiple targets is suspected, the sample should be re-tested with another FDA cleared, approved or authorized test, if coinfection would change clinical management. [facility] are certified under the Clinical Laboratory Improvement Amendments of 1988 (CLIA-88) as  qualified to perform moderate and/or high complexity laboratory testing.       Medications - No data to display    Assessments & Plan (with Medical Decision Making)     I have reviewed the nursing notes.    I have reviewed the findings, diagnosis, plan and need for follow up with the patient.    Ruthy Hugo is a 56 year old female who presents today with cough, chest congestion, shortness of breath, ear pain, sore throat, runny nose congestion, fatigue, and body aches that started 3 days ago.  Patient states this is very similar to when  she had COVID in 2020.  She denies any chest pain, heart racing or skipping beats, abdominal pain, nausea or vomiting, diarrhea, rash, drainage from ears, headache or dizziness.  Patient states she took some over the counter cough medication and slept sitting upright last night which improved symptoms.  She has no concerns for cardiac issues and declines cardiac work-up today.  She denies any asthma, diabetes, heart disease, immune compromise symptoms.  She had the Parmjit & Parmjit COVID-vaccine but has not been boosted    Exam findings above.  Vitals within normal limits.  Patient does not appear toxic and in no acute distress.  COVID test today came back positive.  Influenza was negative.  Patient declined chest x-ray and cardiac work-up at this time.  Patient's MASSBP scoring was 2 and after discussion of Molnupiravir side effects and contraceptive needs patient states she liked to take this medication.  She states no concern for pregnancy issues since she has had tubal ligation in the past.  Patient discharged in stable condition informed to return if signs or symptoms worsen or change.  These were discussed and given on discharge paperwork.    Discharge Medication List as of 6/1/2022  2:00 PM      START taking these medications    Details   molnupiravir (LAGEVRIO) 200 MG capsule Take 4 capsules (800 mg) by mouth every 12 hours for 5 days, Disp-40 each, R-0, E-PrescribeMolnupiravir ordered as patient requested over more effective Monoclonal infusions through [facility]. If Molnupiravir is unavailable, notify patient they can refer th emselves to [facility] at [email or web address].             Final diagnoses:   Exposure to 2019 novel coronavirus   Infection due to 2019 novel coronavirus       6/1/2022   [facility] EMERGENCY DEPT     Cait Taylor PA-C  06/01/22 2050

## 2022-06-01 NOTE — ED TRIAGE NOTES
Cough started Monday and has progressively gotten worse, started with congestion and fever last night into today. Reports fatigue and body aches     Triage Assessment     Row Name 06/01/22 1227       Triage Assessment (Adult)    Airway WDL WDL       Respiratory WDL    Respiratory WDL X;cough    Cough Frequency frequent    Cough Type congested       Skin Circulation/Temperature WDL    Skin Circulation/Temperature WDL WDL       Cardiac WDL    Cardiac WDL WDL       Peripheral/Neurovascular WDL    Peripheral Neurovascular WDL WDL       Cognitive/Neuro/Behavioral WDL    Cognitive/Neuro/Behavioral WDL WDL

## 2022-06-01 NOTE — DISCHARGE INSTRUCTIONS
You are to remain quarantined for the next 5 days, and for the next 5 days after your initial quarantine you are to wear a face mask at all times when you are out in public.    Use medication as prescribed.  Can cause nausea, vomiting, diarrhea.  Make sure that she use a second form of birth control for the next 3 to 6 months.    Increase fluids, rest, Tylenol and ibuprofen over-the-counter as needed for fevers, Mucinex to help with cough and congestion.  Return the emergency department symptoms worsen or change including worsening shortness of breath, chest pain, heart racing or skipping beats, fevers greater than 104F, calf pain or swelling.

## 2022-07-18 ENCOUNTER — TELEPHONE (OUTPATIENT)
Dept: PODIATRY | Facility: CLINIC | Age: 57
End: 2022-07-18

## 2022-07-18 NOTE — TELEPHONE ENCOUNTER
[facility] Call Center    Phone Message    May a detailed message be left on voicemail: yes     Reason for Call: Other: Pt is requesting a Prescription for orthopedic shoes sent to [facility] orthopedics in [facility]     Action Taken: Other: [facility]     Travel Screening: Not Applicable

## 2022-09-13 ASSESSMENT — ENCOUNTER SYMPTOMS
CHILLS: 0
ABDOMINAL PAIN: 0
HEARTBURN: 0
FEVER: 0
BREAST MASS: 0
DYSURIA: 0
PALPITATIONS: 0
HEADACHES: 0
FREQUENCY: 0
CONSTIPATION: 0
HEMATURIA: 0
EYE PAIN: 0
MYALGIAS: 1
JOINT SWELLING: 0
SORE THROAT: 0
HEMATOCHEZIA: 0
COUGH: 0
DIZZINESS: 0
PARESTHESIAS: 1
WEAKNESS: 1
SHORTNESS OF BREATH: 1
NERVOUS/ANXIOUS: 0
NAUSEA: 0
DIARRHEA: 0
ARTHRALGIAS: 1

## 2022-09-20 ENCOUNTER — OFFICE VISIT (OUTPATIENT)
Dept: FAMILY MEDICINE | Facility: CLINIC | Age: 57
End: 2022-09-20
Payer: COMMERCIAL

## 2022-09-20 ENCOUNTER — MYC MEDICAL ADVICE (OUTPATIENT)
Dept: FAMILY MEDICINE | Facility: CLINIC | Age: 57
End: 2022-09-20

## 2022-09-20 VITALS
WEIGHT: 293 LBS | DIASTOLIC BLOOD PRESSURE: 72 MMHG | TEMPERATURE: 98.3 F | BODY MASS INDEX: 43.4 KG/M2 | SYSTOLIC BLOOD PRESSURE: 138 MMHG | OXYGEN SATURATION: 97 % | HEIGHT: 69 IN | RESPIRATION RATE: 16 BRPM | HEART RATE: 106 BPM

## 2022-09-20 DIAGNOSIS — N89.8 VAGINAL ODOR: ICD-10-CM

## 2022-09-20 DIAGNOSIS — E11.9 TYPE 2 DIABETES MELLITUS WITHOUT COMPLICATION, WITHOUT LONG-TERM CURRENT USE OF INSULIN (H): ICD-10-CM

## 2022-09-20 DIAGNOSIS — Z00.00 ROUTINE GENERAL MEDICAL EXAMINATION AT A HEALTH CARE FACILITY: Primary | ICD-10-CM

## 2022-09-20 DIAGNOSIS — G89.4 CHRONIC PAIN SYNDROME: ICD-10-CM

## 2022-09-20 DIAGNOSIS — G89.29 CHRONIC BILATERAL LOW BACK PAIN WITH BILATERAL SCIATICA: ICD-10-CM

## 2022-09-20 DIAGNOSIS — E66.01 CLASS 3 SEVERE OBESITY DUE TO EXCESS CALORIES WITH SERIOUS COMORBIDITY AND BODY MASS INDEX (BMI) OF 45.0 TO 49.9 IN ADULT (H): ICD-10-CM

## 2022-09-20 DIAGNOSIS — N89.8 VAGINAL DISCHARGE: ICD-10-CM

## 2022-09-20 DIAGNOSIS — M54.41 CHRONIC BILATERAL LOW BACK PAIN WITH BILATERAL SCIATICA: ICD-10-CM

## 2022-09-20 DIAGNOSIS — F33.1 MAJOR DEPRESSIVE DISORDER, RECURRENT EPISODE, MODERATE (H): ICD-10-CM

## 2022-09-20 DIAGNOSIS — Z23 NEED FOR VACCINATION: ICD-10-CM

## 2022-09-20 DIAGNOSIS — E11.9 TYPE 2 DIABETES MELLITUS WITHOUT COMPLICATION, WITHOUT LONG-TERM CURRENT USE OF INSULIN (H): Primary | ICD-10-CM

## 2022-09-20 DIAGNOSIS — E66.813 CLASS 3 SEVERE OBESITY DUE TO EXCESS CALORIES WITH SERIOUS COMORBIDITY AND BODY MASS INDEX (BMI) OF 45.0 TO 49.9 IN ADULT (H): ICD-10-CM

## 2022-09-20 DIAGNOSIS — M54.42 CHRONIC BILATERAL LOW BACK PAIN WITH BILATERAL SCIATICA: ICD-10-CM

## 2022-09-20 PROBLEM — E10.9 TYPE 1 DIABETES, HBA1C GOAL < 7% (H): Status: ACTIVE | Noted: 2022-07-19

## 2022-09-20 PROBLEM — E10.9 TYPE 1 DIABETES, HBA1C GOAL < 7% (H): Status: RESOLVED | Noted: 2022-07-19 | Resolved: 2022-09-20

## 2022-09-20 PROBLEM — E03.9 HYPOTHYROIDISM (ACQUIRED): Status: ACTIVE | Noted: 2022-09-20

## 2022-09-20 PROBLEM — R87.810 ASCUS WITH POSITIVE HIGH RISK HPV CERVICAL: Status: ACTIVE | Noted: 2022-09-20

## 2022-09-20 PROBLEM — R87.610 ASCUS WITH POSITIVE HIGH RISK HPV CERVICAL: Status: ACTIVE | Noted: 2022-09-20

## 2022-09-20 LAB
CLUE CELLS: ABNORMAL
HBA1C MFR BLD: 7.4 % (ref 0–5.6)
TRICHOMONAS, WET PREP: ABNORMAL
WBC'S/HIGH POWER FIELD, WET PREP: ABNORMAL
YEAST, WET PREP: ABNORMAL

## 2022-09-20 PROCEDURE — 90682 RIV4 VACC RECOMBINANT DNA IM: CPT | Performed by: STUDENT IN AN ORGANIZED HEALTH CARE EDUCATION/TRAINING PROGRAM

## 2022-09-20 PROCEDURE — 87491 CHLMYD TRACH DNA AMP PROBE: CPT | Performed by: STUDENT IN AN ORGANIZED HEALTH CARE EDUCATION/TRAINING PROGRAM

## 2022-09-20 PROCEDURE — 87591 N.GONORRHOEAE DNA AMP PROB: CPT | Performed by: STUDENT IN AN ORGANIZED HEALTH CARE EDUCATION/TRAINING PROGRAM

## 2022-09-20 PROCEDURE — 87210 SMEAR WET MOUNT SALINE/INK: CPT | Performed by: STUDENT IN AN ORGANIZED HEALTH CARE EDUCATION/TRAINING PROGRAM

## 2022-09-20 PROCEDURE — 99214 OFFICE O/P EST MOD 30 MIN: CPT | Mod: 25 | Performed by: STUDENT IN AN ORGANIZED HEALTH CARE EDUCATION/TRAINING PROGRAM

## 2022-09-20 PROCEDURE — 99386 PREV VISIT NEW AGE 40-64: CPT | Mod: 25 | Performed by: STUDENT IN AN ORGANIZED HEALTH CARE EDUCATION/TRAINING PROGRAM

## 2022-09-20 PROCEDURE — 90471 IMMUNIZATION ADMIN: CPT | Performed by: STUDENT IN AN ORGANIZED HEALTH CARE EDUCATION/TRAINING PROGRAM

## 2022-09-20 PROCEDURE — 80306 DRUG TEST PRSMV INSTRMNT: CPT | Performed by: STUDENT IN AN ORGANIZED HEALTH CARE EDUCATION/TRAINING PROGRAM

## 2022-09-20 PROCEDURE — 83036 HEMOGLOBIN GLYCOSYLATED A1C: CPT | Performed by: STUDENT IN AN ORGANIZED HEALTH CARE EDUCATION/TRAINING PROGRAM

## 2022-09-20 PROCEDURE — 36415 COLL VENOUS BLD VENIPUNCTURE: CPT | Performed by: STUDENT IN AN ORGANIZED HEALTH CARE EDUCATION/TRAINING PROGRAM

## 2022-09-20 RX ORDER — CLINDAMYCIN PHOSPHATE 10 MG/G
GEL TOPICAL
COMMUNITY
End: 2022-09-20

## 2022-09-20 RX ORDER — NAPROXEN SODIUM 220 MG
220 TABLET ORAL
COMMUNITY

## 2022-09-20 RX ORDER — GINKGO BILOBA 60 MG
TABLET ORAL
COMMUNITY
End: 2023-02-07

## 2022-09-20 RX ORDER — CYCLOBENZAPRINE HCL 10 MG
TABLET ORAL
COMMUNITY
Start: 2022-07-19 | End: 2022-09-20

## 2022-09-20 RX ORDER — MULTIVIT WITH MINERALS/LUTEIN
500 TABLET ORAL
COMMUNITY

## 2022-09-20 RX ORDER — UBIDECARENONE 100 MG
200 CAPSULE ORAL
COMMUNITY
End: 2023-02-07

## 2022-09-20 RX ORDER — SUMATRIPTAN 100 MG/1
50 TABLET, FILM COATED ORAL
COMMUNITY
Start: 2022-05-04 | End: 2022-09-20

## 2022-09-20 RX ORDER — BUPROPION HYDROCHLORIDE 150 MG/1
TABLET, EXTENDED RELEASE ORAL
COMMUNITY
Start: 2022-06-28 | End: 2022-09-20

## 2022-09-20 RX ORDER — BIOTIN 10 MG
TABLET ORAL
COMMUNITY
End: 2023-06-13

## 2022-09-20 RX ORDER — GABAPENTIN 100 MG/1
100-200 CAPSULE ORAL
COMMUNITY
Start: 2022-05-03 | End: 2022-09-20

## 2022-09-20 RX ORDER — BUPROPION HYDROCHLORIDE 150 MG/1
TABLET, EXTENDED RELEASE ORAL
COMMUNITY
Start: 2022-09-20 | End: 2022-12-21

## 2022-09-20 ASSESSMENT — ENCOUNTER SYMPTOMS
FREQUENCY: 0
COUGH: 0
DIARRHEA: 0
ARTHRALGIAS: 1
HEMATURIA: 0
DIZZINESS: 0
PARESTHESIAS: 1
SHORTNESS OF BREATH: 1
HEMATOCHEZIA: 0
CONSTIPATION: 0
MYALGIAS: 1
PALPITATIONS: 0
SORE THROAT: 0
JOINT SWELLING: 0
NAUSEA: 0
WEAKNESS: 1
EYE PAIN: 0
BREAST MASS: 0
DYSURIA: 0
HEARTBURN: 0
HEADACHES: 0
ABDOMINAL PAIN: 0
NERVOUS/ANXIOUS: 0
FEVER: 0
CHILLS: 0

## 2022-09-20 ASSESSMENT — PAIN SCALES - GENERAL: PAINLEVEL: MILD PAIN (2)

## 2022-09-20 NOTE — LETTER
Opioid / Opioid Plus Controlled Substance Agreement    This is an agreement between you and your provider about the safe and appropriate use of controlled substance/opioids prescribed by your care team. Controlled substances are medicines that can cause physical and mental dependence (abuse).    There are strict laws about having and using these medicines. We here at [facility] are committing to working with you in your efforts to get better. To support you in this work, we ll help you schedule regular office appointments for medicine refills. If we must cancel or change your appointment for any reason, we ll make sure you have enough medicine to last until your next appointment.     As a Provider, I will:    Listen carefully to your concerns and treat you with respect.     Recommend a treatment plan that I believe is in your best interest. This plan may involve therapies other than opioid pain medication.     Talk with you often about the possible benefits, and the risk of harm of any medicine that we prescribe for you.     Provide a plan on how to taper (discontinue or go off) using this medicine if the decision is made to stop its use.    As a Patient, I understand that opioid(s):     Are a controlled substance prescribed by my care team to help me function or work and manage my condition(s).     Are strong medicines and can cause serious side effects such as:    Drowsiness, which can seriously affect my driving ability    A lower breathing rate, enough to cause death    Harm to my thinking ability     Depression     Abuse of and addiction to this medicine    Need to be taken exactly as prescribed. Combining opioids with certain medicines or chemicals (such as illegal drugs, sedatives, sleeping pills, and benzodiazepines) can be dangerous or even fatal. If I stop opioids suddenly, I may have severe withdrawal symptoms.    Do not work for all types of pain nor for all patients. If they re not helpful, I may  be asked to stop them.    The risks, benefits and side effects of these medicine(s) were explained to me. I agree that:  1. I will take part in other treatments as advised by my care team. This may be psychiatry or counseling, physical therapy, behavioral therapy, group treatment or a referral to a specialist.     2. I will keep all my appointments. I understand that this is part of the monitoring of opioids. My care team may require an office visit for EVERY opioid/controlled substance refill. If I miss appointments or don t follow instructions, my care team may stop my medicine.    3. I will take my medicines as prescribed. I will not change the dose or schedule unless my care team tells me to. There will be no refills if I run out early.     4. I may be asked to come to the clinic and complete a urine drug test or complete a pill count at any time. If I don t give a urine sample or participate in a pill count, the care team may stop my medicine.    5. I will only receive prescriptions from this clinic for chronic pain. If I am treated by another provider for acute pain issues, I will tell them that I am taking opioid pain medication for chronic pain and that I have a treatment agreement with this provider. I will inform my [facility] care team within one business day if I am given a prescription for any pain medication by another healthcare provider. My [facility] care team can contact other providers and pharmacists about my use of any medicines.    6. It is up to me to make sure that I don t run out of my medicines on weekends or holidays. If my care team is willing to refill my opioid prescription without a visit, I must request refills only during office hours. Refills may take up to 3 business days to process. I will use one pharmacy to fill all my opioid and other controlled substance prescriptions. I will notify the clinic about any changes to my insurance or medication availability.    7. I  am responsible for my prescriptions. If the medicine/prescription is lost, stolen or destroyed, it will not be replaced. I also agree not to share controlled substance medicines with anyone.    8. I am aware I should not use any illegal or recreational drugs. I agree not to drink alcohol unless my care team says I can.       9. If I enroll in the [place] [facility] Cannabis program, I will tell my care team prior to my next refill.     10. I will tell my care team right away if I become pregnant, have a new medical problem treated outside of my regular clinic, or have a change in my medications.    11. I understand that this medicine can affect my thinking, judgment and reaction time. Alcohol and drugs affect the brain and body, which can affect the safety of my driving. Being under the influence of alcohol or drugs can affect my decision-making, behaviors, personal safety, and the safety of others. Driving while impaired (DWI) can occur if a person is driving, operating, or in physical control of a car, motorcycle, boat, snowmobile, ATV, motorbike, off-road vehicle, or any other motor vehicle ([place] Statute 169A.20). I understand the risk if I choose to drive or operate any vehicle or machinery.    I understand that if I do not follow any of the conditions above, my prescriptions or treatment may be stopped or changed.          Opioids  What You Need to Know    What are opioids?   Opioids are pain medicines that must be prescribed by a doctor. They are also known as narcotics.     Examples are:   1. morphine (MS Contin, Beverley)  2. oxycodone (Oxycontin)  3. oxycodone and acetaminophen (Percocet)  4. hydrocodone and acetaminophen (Vicodin, Norco)   5. fentanyl patch (Duragesic)   6. hydromorphone (Dilaudid)   7. methadone  8. codeine (Tylenol #3)     What do opioids do well?   Opioids are best for severe short-term pain such as after a surgery or injury. They may work well for cancer pain. They may help some people with  long-lasting (chronic) pain.     What do opioids NOT do well?   Opioids never get rid of pain entirely, and they don t work well for most patients with chronic pain. Opioids don t reduce swelling, one of the causes of pain.                                    Other ways to manage chronic pain and improve function include:       Treat the health problem that may be causing pain    Anti-inflammation medicines, which reduce swelling and tenderness, such as ibuprofen (Advil, Motrin) or naproxen (Aleve)    Acetaminophen (Tylenol)    Antidepressants and anti-seizure medicines, especially for nerve pain    Topical treatments such as patches or creams    Injections or nerve blocks    Chiropractic or osteopathic treatment    Acupuncture, massage, deep breathing, meditation, visual imagery, aromatherapy    Use heat or ice at the pain site    Physical therapy     Exercise    Stop smoking    Take part in therapy       Risks and side effects     Talk to your doctor before you start or decide to keep taking opioids. Possible side effects include:      Lowering your breathing rate enough to cause death    Overdose, including death, especially if taking higher than prescribed doses    Worse depression symptoms; less pleasure in things you usually enjoy    Feeling tired or sluggish    Slower thoughts or cloudy thinking    Being more sensitive to pain over time; pain is harder to control    Trouble sleeping or restless sleep    Changes in hormone levels (for example, less testosterone)    Changes in sex drive or ability to have sex    Constipation    Unsafe driving    Itching and sweating    Dizziness    Nausea, throwing up and dry mouth    What else should I know about opioids?    Opioids may lead to dependence, tolerance, or addiction.      Dependence means that if you stop or reduce the medicine too quickly, you will have withdrawal symptoms. These include loose poop (diarrhea), jitters, flu-like symptoms, nervousness and tremors.  Dependence is not the same as addiction.                       Tolerance means needing higher doses over time to get the same effect. This may increase the chance of serious side effects.      Addiction is when people improperly use a substance that harms their body, their mind or their relations with others. Use of opiates can cause a relapse of addiction if you have a history of drug or alcohol abuse.      People who have used opioids for a long time may have a lower quality of life, worse depression, higher levels of pain and more visits to doctors.    You can overdose on opioids. Take these steps to lower your risk of overdose:    1. Recognize the signs:  Signs of overdose include decrease or loss of consciousness (blackout), slowed breathing, trouble waking up and blue lips. If someone is worried about overdose, they should call 911.    2. Talk to your doctor about Narcan (naloxone).   If you are at risk for overdose, you may be given a prescription for Narcan. This medicine very quickly reverses the effects of opioids.   If you overdose, a friend or family member can give you Narcan while waiting for the ambulance. They need to know the signs of overdose and how to give Narcan.     3. Don't use alcohol or street drugs.   Taking them with opioids can cause death.    4. Do not take any of these medicines unless your doctor says it s OK. Taking these with opioids can cause death:    Benzodiazepines, such as lorazepam (Ativan), alprazolam (Xanax) or diazepam (Valium)    Muscle relaxers, such as cyclobenzaprine (Flexeril)    Sleeping pills like zolpidem (Ambien)     Other opioids      How to keep you and other people safe while taking opioids:    1. Never share your opioids with others.  Opioid medicines are regulated by the Drug Enforcement Agency ([place]). Selling or sharing medications is a criminal act.    2. Be sure to store opioids in a secure place, locked up if possible. Young children can easily swallow them  and overdose.    3. When you are traveling with your medicines, keep them in the original bottles. If you use a pill box, be sure you also carry a copy of your medicine list from your clinic or pharmacy.    4. Safe disposal of opioids    Most pharmacies have places to get rid of medicine, called disposal kiosks. Medicine disposal options are also available in every [facility]. Search your county and  medication disposal  to find more options. You can find more details at:  [email or web address]     I agree that my provider, clinic care team, and pharmacy may work with any city, state or federal law enforcement agency that investigates the misuse, sale, or other diversion of my controlled medicine. I will allow my provider to discuss my care with, or share a copy of, this agreement with any other treating provider, pharmacy or emergency room where I receive care.    I have read this agreement and have asked questions about anything I did not understand.    _______________________________________________________  Patient Signature - Ruthy Hugo _____________________                   Date     _______________________________________________________  Provider Signature - Rebecca Carcamo MD   _____________________                   Date     _______________________________________________________  Witness Signature (required if provider not present while patient signing)   _____________________                   Date

## 2022-09-20 NOTE — PROGRESS NOTES
SUBJECTIVE:   CC: Ruthy is an 56 year old who presents for preventive health visit.   Chief Complaint   Patient presents with     Physical     Looking for a new octor  Chronic back pain    Ammonia smell from vagina  Not itchy, not constant, but is freqent  Some discharge.   Has had issues and been on metronidazole. Only once a day didn't do anything.     Really bad abdominal pain - right side, went away  Once in a while will ache.   No menstrual cycle for 3 years.    Patient has been advised of split billing requirements and indicates understanding: Yes     Healthy Habits:     Getting at least 3 servings of Calcium per day:  NO    Bi-annual eye exam:  NO    Dental care twice a year:  Yes    Sleep apnea or symptoms of sleep apnea:  Excessive snoring    Diet:  Regular (no restrictions)    Frequency of exercise:  None    Taking medications regularly:  Yes    Medication side effects:  Not applicable    PHQ-2 Total Score: 2    Additional concerns today:  No      Today's PHQ-2 Score:   PHQ-2 (  [employer]) 2022   Q1: Little interest or pleasure in doing things 1   Q2: Feeling down, depressed or hopeless 1   PHQ-2 Score 2   Q1: Little interest or pleasure in doing things Several days   Q2: Feeling down, depressed or hopeless Several days   PHQ-2 Score 2     Abuse: Current or Past (Physical, Sexual or Emotional) - No  Do you feel safe in your environment? Yes    Have you ever done Advance Care Planning? (For example, a Health Directive, POLST, or a discussion with a medical provider or your loved ones about your wishes): No, advance care planning information given to patient to review.  Patient plans to discuss their wishes with loved ones or provider.      Social History     Tobacco Use     Smoking status: Former Smoker     Years: 8.00     Types: Cigarettes     Start date: 1982     Quit date: 1990     Years since quittin.7     Smokeless tobacco: Never Used   Substance Use Topics     Alcohol use: Not  Currently     If you drink alcohol do you typically have >3 drinks per day or >7 drinks per week? Not applicable    Alcohol Use 9/20/2022   Prescreen: >3 drinks/day or >7 drinks/week? -   Prescreen: >3 drinks/day or >7 drinks/week? Not Applicable       Reviewed orders with patient.  Reviewed health maintenance and updated orders accordingly - Yes    Breast Cancer Screening:    FHS-7:   Breast CA Risk Assessment (FHS-7) 9/13/2022   Did any of your first-degree relatives have breast or ovarian cancer? No   Did any of your relatives have bilateral breast cancer? No   Did any man in your family have breast cancer? No   Did any woman in your family have breast and ovarian cancer? No   Did any woman in your family have breast cancer before age 50 y? No   Do you have 2 or more relatives with breast and/or ovarian cancer? No   Do you have 2 or more relatives with breast and/or bowel cancer? No     Mammogram Screening: Recommended mammography every 1-2 years with patient discussion and risk factor consideration  Pertinent mammograms are reviewed under the imaging tab.    History of abnormal Pap smear: YES - updated in Problem List and Health Maintenance accordingly  1 or 2 colposcopies. Listed colposcopies in 2011 and 2012 in outside record with negative biopsy. No abnormal since then.     Last Pap 1/25/22 in [facility] system. Negative HPV, NIL    Last colonoscopy 8/4/2016, negative, repeat 10 years.        Reviewed and updated as needed this visit by clinical staff   Tobacco  Allergies  Meds                Reviewed and updated as needed this visit by Provider                     Review of Systems   Constitutional: Negative for chills and fever.   HENT: Negative for congestion, ear pain, hearing loss and sore throat.    Eyes: Positive for visual disturbance. Negative for pain.   Respiratory: Positive for shortness of breath. Negative for cough.    Cardiovascular: Negative for chest pain, palpitations and peripheral edema.  "  Gastrointestinal: Negative for abdominal pain, constipation, diarrhea, heartburn, hematochezia and nausea.   Breasts:  Negative for tenderness, breast mass and discharge.   Genitourinary: Positive for urgency. Negative for dysuria, frequency, genital sores, hematuria, pelvic pain, vaginal bleeding and vaginal discharge.   Musculoskeletal: Positive for arthralgias and myalgias. Negative for joint swelling.   Skin: Negative for rash.   Neurological: Positive for weakness and paresthesias. Negative for dizziness and headaches.   Psychiatric/Behavioral: Negative for mood changes. The patient is not nervous/anxious.      Main thing last time was the paresthesias in the hands. constantly shaking, hands cramping with writing and typing.   It is getting better.      OBJECTIVE:   /72   Pulse 106   Temp 98.3  F (36.8  C) (Tympanic)   Resp 16   Ht 1.74 m (5' 8.5\")   Wt 143.3 kg (316 lb)   LMP  (LMP Unknown)   SpO2 97%   Breastfeeding No   BMI 47.35 kg/m    Physical Exam  GENERAL APPEARANCE: healthy, alert and no distress  EYES: Eyes grossly normal to inspection, and conjunctivae and sclerae normal  HENT: ear canals and TM's normal, nose and mouth without ulcers or lesions, oropharynx clear and oral mucous membranes moist  NECK: no adenopathy, no asymmetry, masses, or scars and thyroid normal to palpation  RESP: lungs clear to auscultation - no rales, rhonchi or wheezes  BREAST: deferred  CV: regular rate and rhythm, normal S1 S2, no S3 or S4, no murmur, click or rub, no peripheral edema and peripheral pulses strong  ABDOMEN: soft, nontender, no hepatosplenomegaly, no masses and bowel sounds normal. In particular, nontender to deep palpation in the RLQ.   MS: no musculoskeletal defects are noted and gait is age appropriate without ataxia  SKIN: no suspicious lesions or rashes on exposed skin  NEURO: Normal tone, sensory exam grossly normal, mentation intact and speech normal  PSYCH: mentation appears normal and " affect normal/bright     Results from this visitNo results found for any visits on 09/20/22.      ASSESSMENT/PLAN:   Ruthy was seen today for physical.    Diagnoses and all orders for this visit:    Routine general medical examination at a health care facility  Patient is a very pleasant 56 year old female with past history of depression, chronic low back pain 2/2 injury many years ago stable on PRN norco and flexeril, obesity, hyperlipidemia, hypothyroidism (last TSH 7/19/22 WNL).     She has previously had abnormal pap many years ago (about 20 years ago), had colposcopy x2 without abnormal findings. Has since not had abnormal pap. Last pap completed in outside system 1/25/22, NIL and HPV negative.     Colonoscopy in 2016, completely normal, due again in 10 years.     Had mammogram earlier this year on 1/18/22, ACR 1 negative.     Last cholesterol check was earlier this year as well. Will need annual screenings done January 2023.     Vaginal discharge  Vaginal odor  Patient is noting that she has had an ammonia smell from the vagina with discharge. She has previously had similar symptoms that were treated with metronidazole and resolved. She tried metronidazole this time but was only taking it once daily and wasn't sure if the dose was high enough. Did not have resolution at this time. Has had new potential exposure to gonorrhea/chlamydia within the past few months. Will test both for bacterial and yeast infection, as well as gonorrhea and chlamydia swab. She self swabbed for these today. Results pending at this time.   -     Wet prep - Clinic Collect  -     NEISSERIA GONORRHOEA PCR  -     CHLAMYDIA TRACHOMATIS PCR    Type 2 diabetes mellitus without complication, without long-term current use of insulin (H)  Patient has family history of diabetes in her sister who is on insulin. Earlier this year, had issues with paresthesias in the hands primarily. A1c checked at that time (through [facility]) and was elevated at  7.5 on 7/19/22. Since then has made dietary changes (at the time had been overeating/eating more sugary foods related to emotional eating with a breakup). Recheck today to monitor need for medications.   -     Hemoglobin A1c    Class 3 severe obesity due to excess calories with serious comorbidity and body mass index (BMI) of 45.0 to 49.9 in adult (H)  Discussed weight briefly today. Patient is well aware of the exercise and diet changes needed and is working on these. Does know that her weight is affecting her new diagnosis of diabetes, as well as some of the joint pains she experiences. Notes     Chronic bilateral low back pain with bilateral sciatica  Chronic pain syndrome  Patient has ongoing chronic low back pain ([facility] pain does radiate up the back and down the legs at times). This is due to previous injury. Uses norco PRN. At times only needs one a month, but more recently has noticed that she does need them a little more frequently. Just recently had this and Flexeril refilled and does not need a refill at this time. She may need a refill in the next few months. Chronic pain/opioid agreement is signed today. She has annual urine drug screen completed usually. UDS was completed today. When patient needs refill within the next 3 months, would plan to refill Norco at 25 tablets, take 1-2 tablets q6h prn for pain, maximum of 2 tablets in 24 hours.   -     Drug Abuse Screen Panel 13, Urine (Pain Care Package) - lab collect    Major depressive disorder, recurrent episode, moderate (H)  Doing better now. [place] mental health was largely impacted by large life changes in the past few months including loss of a relationship and changes at work. Now feels it is much better.   - Stable on 300 mg QAM, 150 mg at bedtime of Wellbutrin SR. Does not need a refill at this time.     Need for vaccination  -     INFLUENZA QUAD, RECOMBINANT, P-FREE (RIV4) (FLUBLOK) AGE 50-64 [RPI370]      Patient has been advised of split billing  "requirements and indicates understanding: Yes    COUNSELING:  Reviewed preventive health counseling, as reflected in patient instructions    Estimated body mass index is 47.35 kg/m  as calculated from the following:    Height as of this encounter: 1.74 m (5' 8.5\").    Weight as of this encounter: 143.3 kg (316 lb).    Weight management plan: Discussed healthy diet and exercise guidelines   Been up and down a lot, this is the heaviest she has been.   Try to get into working out and then may have a small injury  Tried to ride bike.   Trying.   Have been through weight watchers   Low 200's are most comfortable.     She reports that she quit smoking about 31 years ago. Her smoking use included cigarettes. She started smoking about 40 years ago. She quit after 8.00 years of use. She has never used smokeless tobacco.      Counseling Resources:  ATP IV Guidelines  Pooled Cohorts Equation Calculator  Breast Cancer Risk Calculator  BRCA-Related Cancer Risk Assessment: FHS-7 Tool  FRAX Risk Assessment  ICSI Preventive Guidelines  Dietary Guidelines for Americans, 2010  USDA's MyPlate  ASA Prophylaxis  Lung CA Screening    Rebecca Carcamo MD  [facility]  "

## 2022-09-20 NOTE — RESULT ENCOUNTER NOTE
Dear Ruthy    Here are your results so far.     The A1c is almost the same as it was back in July (was 7.5, now is 7.4). At this point, we could talk about some options for diabetes treatment. With an A1c of this level, we could try a pill called metformin, or could talk about starting an injectable medication, some of which can help with weight loss. It also may be helpful to have you meet with the diabetes educator we have here in clinic to learn more about diabetes and diabetes management. If you are open to seeing our diabetes educator, I can certainly put in a referral now. For any discussion of treatment, sometimes our diabetes educators can help with deciding what medication adjustments to make (the one we have here is fantastic!). Otherwise, we can have you have a follow up appointment with me or one of my colleagues at your earliest convenience to talk about some treatment options.     The Wet prep did not show any obvious bacterial or yeast infection of the vagina. With that said, there isn't anything I would recommend for treatment at this time. We can wait and see what the gonorrhea and chlamydia swabs say, too. Those may take a day or two to return. With menopause, the vagina changes quite a bit and some of the changes you're noticing may be related more to that.    Please message me if you have any concerns.  Rebecca Carcamo MD

## 2022-09-21 ENCOUNTER — LAB (OUTPATIENT)
Dept: FAMILY MEDICINE | Facility: CLINIC | Age: 57
End: 2022-09-21
Attending: FAMILY MEDICINE
Payer: COMMERCIAL

## 2022-09-21 DIAGNOSIS — Z20.822 SUSPECTED 2019 NOVEL CORONAVIRUS INFECTION: ICD-10-CM

## 2022-09-21 LAB
AMPHETAMINES UR QL: NOT DETECTED
BARBITURATES UR QL SCN: NOT DETECTED
BENZODIAZ UR QL SCN: NOT DETECTED
BUPRENORPHINE UR QL: NOT DETECTED
C TRACH DNA SPEC QL NAA+PROBE: NEGATIVE
CANNABINOIDS UR QL: NOT DETECTED
COCAINE UR QL SCN: NOT DETECTED
D-METHAMPHET UR QL: NOT DETECTED
METHADONE UR QL SCN: NOT DETECTED
N GONORRHOEA DNA SPEC QL NAA+PROBE: NEGATIVE
OPIATES UR QL SCN: NOT DETECTED
OXYCODONE UR QL SCN: NOT DETECTED
PCP UR QL SCN: NOT DETECTED
PROPOXYPH UR QL: NOT DETECTED
SARS-COV-2 RNA RESP QL NAA+PROBE: NEGATIVE
TRICYCLICS UR QL SCN: DETECTED

## 2022-09-21 PROCEDURE — U0003 INFECTIOUS AGENT DETECTION BY NUCLEIC ACID (DNA OR RNA); SEVERE ACUTE RESPIRATORY SYNDROME CORONAVIRUS 2 (SARS-COV-2) (CORONAVIRUS DISEASE [COVID-19]), AMPLIFIED PROBE TECHNIQUE, MAKING USE OF HIGH THROUGHPUT TECHNOLOGIES AS DESCRIBED BY CMS-2020-01-R: HCPCS

## 2022-09-21 PROCEDURE — U0005 INFEC AGEN DETEC AMPLI PROBE: HCPCS

## 2022-09-21 PROCEDURE — 99207 PR NO CHARGE LOS: CPT

## 2022-09-21 ASSESSMENT — PATIENT HEALTH QUESTIONNAIRE - PHQ9
SUM OF ALL RESPONSES TO PHQ QUESTIONS 1-9: 0
SUM OF ALL RESPONSES TO PHQ QUESTIONS 1-9: 0

## 2022-09-21 NOTE — RESULT ENCOUNTER NOTE
Dear Ruthy    Here are your results. Gonorrhea and Chlamydia testing was negative. If you develop new symptoms, we can certainly test again. Otherwise at this point, I'm not concerned that an infection is causing the change in discharge or the odor.     Please message me if you have any concerns.  Rebecca Carcamo MD

## 2022-10-13 ENCOUNTER — TELEPHONE (OUTPATIENT)
Dept: FAMILY MEDICINE | Facility: CLINIC | Age: 57
End: 2022-10-13

## 2022-10-13 ENCOUNTER — ALLIED HEALTH/NURSE VISIT (OUTPATIENT)
Dept: EDUCATION SERVICES | Facility: CLINIC | Age: 57
End: 2022-10-13
Payer: COMMERCIAL

## 2022-10-13 DIAGNOSIS — E11.9 TYPE 2 DIABETES MELLITUS WITHOUT COMPLICATION, WITHOUT LONG-TERM CURRENT USE OF INSULIN (H): ICD-10-CM

## 2022-10-13 DIAGNOSIS — E11.9 TYPE 2 DIABETES MELLITUS WITHOUT COMPLICATION, WITHOUT LONG-TERM CURRENT USE OF INSULIN (H): Primary | ICD-10-CM

## 2022-10-13 PROCEDURE — G0108 DIAB MANAGE TRN  PER INDIV: HCPCS | Performed by: DIETITIAN, REGISTERED

## 2022-10-13 RX ORDER — BLOOD-GLUCOSE SENSOR
1 EACH MISCELLANEOUS
Qty: 2 EACH | Refills: 11 | Status: SHIPPED | OUTPATIENT
Start: 2022-10-13 | End: 2023-06-13

## 2022-10-13 RX ORDER — BLOOD-GLUCOSE METER
EACH MISCELLANEOUS
Qty: 1 KIT | Refills: 0 | Status: SHIPPED | OUTPATIENT
Start: 2022-10-13 | End: 2023-06-13

## 2022-10-13 RX ORDER — GLUCOSAMINE HCL/CHONDROITIN SU 500-400 MG
CAPSULE ORAL
Qty: 100 EACH | Refills: 3 | Status: SHIPPED | OUTPATIENT
Start: 2022-10-13 | End: 2023-06-13

## 2022-10-13 RX ORDER — LANCETS
EACH MISCELLANEOUS
Qty: 100 EACH | Refills: 11 | Status: SHIPPED | OUTPATIENT
Start: 2022-10-13 | End: 2022-10-13

## 2022-10-13 RX ORDER — BLOOD SUGAR DIAGNOSTIC
STRIP MISCELLANEOUS
Qty: 100 STRIP | Refills: 6 | Status: SHIPPED | OUTPATIENT
Start: 2022-10-13 | End: 2023-06-13

## 2022-10-13 RX ORDER — BLOOD SUGAR DIAGNOSTIC
STRIP MISCELLANEOUS
Qty: 200 STRIP | Refills: 11 | Status: SHIPPED | OUTPATIENT
Start: 2022-10-13 | End: 2023-02-14

## 2022-10-13 NOTE — TELEPHONE ENCOUNTER
We received all the rx's for [facility] diabetic testing supplies except for the Onetouch Delica Lancets        Thank You,  Marina Alejandro, [facility], [place]

## 2022-10-13 NOTE — PATIENT INSTRUCTIONS
Test blood sugars twice daily.  Before meal and two hours after, rotate meals.  Goal is  mg/dL premeal and under 180 mg/dL two hours post meal.    2.  Watch carbohydrates 30-45 grams per meal and 15-30 grams per snack.  IF you need to go to 60 grams per meal that is ok.  Work on getting rid of the COKE.      3.  Work on adding back in the activity tapes.

## 2022-10-13 NOTE — PROGRESS NOTES
Diabetes Self-Management Education & Support    Presents for: Initial Assessment for new diagnosis    Type of Service: In Person Visit    Assessment Type:   ASSESSMENT:  -  New diabetic  -is down to about 2 cokes per day, encouraged her to continue to work on that  -went over meal plan  -she will work on more activity., doing videos    Patient's most recent   Lab Results   Component Value Date    A1C 7.4 09/20/2022     is not meeting goal of <7.0    Diabetes knowledge and skills assessment:   Patient is knowledgeable in diabetes management concepts related to: new to diabetes    Continue education with the following diabetes management concepts: Healthy Eating, Being Active, Monitoring, Taking Medication, Problem Solving, Reducing Risks and Healthy Coping    Based on learning assessment above, most appropriate setting for further diabetes education would be: Individual setting.      PLAN  1.  Test blood sugars twice daily.  Before meal and two hours after, rotate meals.  Goal is  mg/dL premeal and under 180 mg/dL two hours post meal.    2.  Watch carbohydrates 30-45 grams per meal and 15-30 grams per snack.  IF you need to go to 60 grams per meal that is ok.  Work on getting rid of the COKE.      3.  Work on adding back in the activity tapes.        Follow-up: in 6 weeks    See Care Plan for co-developed, patient-state behavior change goals.  AVS provided for patient today.    Education Materials Provided:  Online [employer] Healthy Living with Diabetes Book and Carbohydrate Counting      SUBJECTIVE/OBJECTIVE:  Presents for: Initial Assessment for new diagnosis  Diabetes education in the past 24mo: No  Diabetes type: Type 2  Disease course: Other  How confident are you filling out medical forms by yourself:: Extremely  Diabetes management related comments/concerns: I don t know if I m a type one or type two.  Cultural Influences/Ethnic Background:  Not  or       Diabetes Symptoms &  "Complications:  Fatigue: Sometimes  Neuropathy: No  Polydipsia: Yes  Polyphagia: No  Polyuria: Yes  Visual change: Sometimes  Slow healing wounds: No  Autonomic neuropathy: No  CVA: No  Heart disease: No  Nephropathy: No  Peripheral neuropathy: No  Peripheral Vascular Disease: No  Retinopathy: No  Sexual dysfunction: No    Patient Problem List and Family Medical History reviewed for relevant medical history, current medical status, and diabetes risk factors.    Vitals:  LMP  (LMP Unknown)   Estimated body mass index is 47.35 kg/m  as calculated from the following:    Height as of 9/20/22: 1.74 m (5' 8.5\").    Weight as of 9/20/22: 143.3 kg (316 lb).   Last 3 BP:   BP Readings from Last 3 Encounters:   09/20/22 138/72   06/01/22 134/82   03/21/22 (!) 142/88       History   Smoking Status     Former     Years: 8.00     Types: Cigarettes     Start date: 8/1/1982     Quit date: 12/31/1990   Smokeless Tobacco     Never       Labs:  Lab Results   Component Value Date    A1C 7.4 09/20/2022     Lab Results   Component Value Date     11/06/2013     No results found for: LDL  No results found for: HDL]  No results found for: GFRESTIMATED  No results found for: GFRESTBLACK  No results found for: CR  No results found for: MICROALBUMIN    Healthy Eating:  Healthy Eating Assessed Today: Yes  Cultural/[place] diet restrictions?: No  Meal planning/habits: None, Frequent snacking  How many times a week on average do you eat food made away from home (restaurant/take-out)?: 1  Meals include: Lunch, Dinner  Breakfast: smoothie activa, strawberry, banana 1/3, charline 1.5 cups or bowl of cereal or pancakesm water  Lunch: frozen meal lean cuisine or mcdonalds or pizza, coke can  Dinner: small burrito and small ribeye steak or pizza frozen 1/2 pizza, coke or water  Snacks: chips, ice cream, chocolate chip cookie dough, pretzels, carrots, apples  Beverages: Water, Milk, Soda    Being Active:  Being Active Assessed Today: Yes (none at " this time)  Exercise::  (has work out tapes she can use.  she will work on that)  Barrier to exercise: None    Monitoring:  Blood Glucose Meter: Unknown  Times checking blood sugar at home (number): Never  Blood glucose trend: Other        Taking Medications:      Current Treatments: None    Problem Solving:                 Reducing Risks:  CAD Risks: Diabetes Mellitus, Dyslipidemia, Obesity, Post-menopausal, Sedentary lifestyle  Has dilated eye exam at least once a year?: No  Sees dentist every 6 months?: Yes  Feet checked by healthcare provider in the last year?: No    Healthy Coping:  Informal Support system:: Family, Friends  Patient Activation Measure Survey Score:  No flowsheet data found.      Care Plan and Education Provided:  Care Plan: Diabetes   Updates made by Enid Crawford RD since 10/13/2022 12:00 AM      Problem: HbA1C Not In Goal       Goal: Establish Regular Follow-Ups with PCP       Task: Discuss with PCP the recommended timing for patient's next follow up visit(s)    Responsible User: Enid Crawford RD      Task: Discuss schedule for PCP visits with patient    Responsible User: Enid Crawford RD      Goal: Get HbA1C Level in Goal       Task: Educate patient on diabetes education self-management topics    Responsible User: Enid Crawford RD      Task: Educate patient on benefits of regular glucose monitoring    Responsible User: Enid Crawford RD      Task: Refer patient to appropriate extended care team member, as needed (Medication Therapy Management, Behavioral Health, Physical Therapy, etc.)    Responsible User: Enid Crawford RD      Task: Discuss diabetes treatment plan with patient    Responsible User: Enid Crawford RD      Problem: Diabetes Self-Management Education Needed to Optimize Self-Care Behaviors       Goal: Understand diabetes pathophysiology and disease progression       Task: Provide education on diabetes pathophysiology and disease progression specfic to patient's diabetes type Completed  10/13/2022   Responsible User: Enid Crawford RD      Goal: Healthy Eating - follow a healthy eating pattern for diabetes    Note:    Work on carbohydrate grams 30-45 per meal, 15-30 per snack     Task: Provide education on portion control and consistency in amount, composition and timing of food intake Completed 10/13/2022   Responsible User: Enid Crawford RD      Task: Provide education on managing carbohydrate intake (carbohydrate counting, plate planning method, etc.) Completed 10/13/2022   Responsible User: Enid Crawford RD      Task: Provide education on weight management    Responsible User: Enid Crawford RD      Task: Provide education on heart healthy eating    Responsible User: Enid Crawford RD      Task: Provide education on eating out    Responsible User: Enid Crawford RD      Task: Develop individualized healthy eating plan with patient    Responsible User: Enid Crawford RD      Goal: Being Active - get regular physical activity, working up to at least 150 minutes per week    Note:    Work on doing your exercise videos again     Task: Provide education on relationship of activity to glucose and precautions to take if at risk for low glucose    Responsible User: Enid Crawford RD      Task: Discuss barriers to physical activity with patient    Responsible User: Enid Crawford RD      Task: Develop physical activity plan with patient    Responsible User: Enid Crawford RD      Task: Explore community resources including walking groups, assistance programs, and home videos    Responsible User: Enid Crawford RD      Goal: Monitoring - monitor glucose and ketones as directed       Task: Provide education on blood glucose monitoring (purpose, proper technique, frequency, glucose targets, interpreting results, when to use glucose control solution, sharps disposal) Completed 10/13/2022   Responsible User: Enid Crawford RD      Task: Provide education on continuous glucose monitoring (sensor placement, use of roxana or  /reader, understanding glucose trends, alerts and alarms, differences between sensor glucose and blood glucose) Completed 10/13/2022   Responsible User: Enid Crawford RD      Task: Provide education on ketone monitoring (when to monitor, frequency, etc.)    Responsible User: Enid Crawford RD      Goal: Taking Medication - patient is consistently taking medications as directed       Task: Provide education on action of prescribed medication, including when to take and possible side effects    Responsible User: Enid Crawford RD      Task: Provide education on insulin and injectable diabetes medications, including administration, storage, site selection and rotation for injection sites    Responsible User: Enid Crawford RD      Task: Discuss barriers to medication adherence with patient and provide management technique ideas as appropriate    Responsible User: Enid Crawford RD      Task: Provide education on frequency and refill details of medications    Responsible User: Enid Crawford RD      Goal: Problem Solving - know how to prevent and manage short-term diabetes complications       Task: Provide education on high blood glucose - causes, signs/symptoms, prevention and treatment    Responsible User: Enid Crawford RD      Task: Provide education on low blood glucose - causes, signs/symptoms, prevention, treatment, carrying a carbohydrate source at all times, and medical identification    Responsible User: Enid Crawford RD      Task: Provide education on safe travel with diabetes    Responsible User: Enid Crawford RD      Task: Provide education on how to care for diabetes on sick days    Responsible User: Enid Crawford RD      Task: Provide education on when to call a health care provider    Responsible User: Enid Crawford RD      Goal: Reducing Risks - know how to prevent and treat long-term diabetes complications       Task: Provide education on major complications of diabetes, prevention, early diagnostic measures  and treatment of complications    Responsible User: Enid Crawford RD      Task: Provide education on recommended care for dental, eye and foot health    Responsible User: Enid Crawford RD      Task: Provide education on Hemoglobin A1c - goals and relationship to blood glucose levels    Responsible User: Enid Crawford RD      Task: Provide education on recommendations for heart health - lipid levels and goals, blood pressure and goals, and aspirin therapy, if indicated    Responsible User: Enid Crawford RD      Task: Provide education on tobacco cessation    Responsible User: Enid Crawford RD      Goal: Healthy Coping - use available resources to cope with the challenges of managing diabetes       Task: Discuss recognizing feelings about having diabetes    Responsible User: Enid Crawford RD      Task: Provide education on the benefits of making appropriate lifestyle changes    Responsible User: Enid Crawford RD      Task: Provide education on benefits of utilizing support systems    Responsible User: Enid Crawford RD      Task: Discuss methods for coping with stress    Responsible User: Enid Crawford RD      Task: Provide education on when to seek professional counseling    Responsible User: Enid Crawford RD              Time Spent: 60 minutes  Encounter Type: Individual    Any diabetes medication dose changes were made via the CDE Protocol per the patient's primary care provider. A copy of this encounter was shared with the provider.

## 2022-10-13 NOTE — TELEPHONE ENCOUNTER
We received rx's for accu-chek strips & lancets.Lamin ins prefers Onetouch, please send 3 new rx's for Onetouch meter, strips & lancets.         Thank You,  [facility]

## 2022-10-20 ENCOUNTER — OFFICE VISIT (OUTPATIENT)
Dept: FAMILY MEDICINE | Facility: CLINIC | Age: 57
End: 2022-10-20
Payer: COMMERCIAL

## 2022-10-20 VITALS
BODY MASS INDEX: 43.4 KG/M2 | HEART RATE: 80 BPM | RESPIRATION RATE: 18 BRPM | SYSTOLIC BLOOD PRESSURE: 130 MMHG | WEIGHT: 293 LBS | HEIGHT: 69 IN | TEMPERATURE: 97.8 F | DIASTOLIC BLOOD PRESSURE: 80 MMHG

## 2022-10-20 DIAGNOSIS — M26.609 TEMPOROMANDIBULAR JOINT DISORDER: Primary | ICD-10-CM

## 2022-10-20 PROCEDURE — 99213 OFFICE O/P EST LOW 20 MIN: CPT | Performed by: FAMILY MEDICINE

## 2022-10-20 ASSESSMENT — PAIN SCALES - GENERAL: PAINLEVEL: NO PAIN (0)

## 2022-10-20 NOTE — NURSING NOTE
"Chief Complaint   Patient presents with     Jaw Pain     /80 (Cuff Size: Adult Large)   Pulse 80   Temp 97.8  F (36.6  C) (Tympanic)   Resp 18   Ht 1.74 m (5' 8.5\")   Wt 141.1 kg (311 lb)   LMP  (LMP Unknown)   BMI 46.60 kg/m   Estimated body mass index is 46.6 kg/m  as calculated from the following:    Height as of this encounter: 1.74 m (5' 8.5\").    Weight as of this encounter: 141.1 kg (311 lb).  Patient presents to the clinic using No DME      Health Maintenance that is potentially due pending provider review:    Health Maintenance Due   Topic Date Due     BMP  Never done     MICROALBUMIN  Never done     DIABETIC FOOT EXAM  Never done     ANNUAL REVIEW OF HM ORDERS  Never done     DEPRESSION ACTION PLAN  Never done     EYE EXAM  Never done     HEPATITIS B IMMUNIZATION (1 of 3 - 3-dose series) Never done     Pneumococcal Vaccine: Pediatrics (0 to 5 Years) and At-Risk Patients (6 to 64 Years) (1 - PCV) Never done     COVID-19 Vaccine (2 - Booster for Te[place] series) 07/30/2021                "

## 2022-10-20 NOTE — PROGRESS NOTES
ASSESSMENT:   (M26.915) Temporomandibular joint disorder  (primary encounter diagnosis)  Comment: new onset in the past 2 weeks of clicking but no pain.  No known injury, no dental work recently.   Plan:   I expect this will likely improve in the next few weeks.   Give your jaw joint a rest-avoid gum chewing, opening mouth very wide like eating huge subs or burgers.   You could try anti-inflammatory medication like increasing your Aleve from one per day to two pills twice daily for 7-10 days.   Take with food so stomach is not irritated.   Bite block could help in case of teeth grinding.   IF persistent or pain is bothersome, I can do ENT physician referral.  You can send [employer] message or contact my staff.          Mitch Bliss is a 56 year old, presenting for the following health issues:  Chief Complaint   Patient presents with     Jaw Pain      No recent injury.    Stressed recently.   Past history of similar problems: no  Annoying, not painful.   Others can hear it.   No recent dental work.   Recent diagnosis of diabetes mellitus in the past week.   Currently doing diet and exercise.      Reason for visit:  Clicking in the right jaw  Symptom onset:  1-2 weeks ago  Symptoms include:  Clicking in the right jaw  Symptom intensity:  Moderate  Symptom progression:  Worsening  Had these symptoms before:  No  What makes it worse:  When it clicks  What makes it better:  If i hold my mouth open a little    Patient Active Problem List   Diagnosis     S/P total knee arthroplasty RIGHT     Osteoarthritis     Pain     Advanced directives, counseling/discussion     Depression     Class 3 severe obesity due to excess calories with serious comorbidity and body mass index (BMI) of 45.0 to 49.9 in adult (H)     Status post total knee replacement LEFT     Major depressive disorder, recurrent episode (H)     Hypothyroidism (acquired)     Hyperlipidemia     High risk HPV infection     Dermatofibroma     ASCUS with positive  "high risk HPV cervical     Allergic rhinitis     Type 2 diabetes mellitus without complication, without long-term current use of insulin (H)        OBJECTIVE:Blood pressure 130/80, pulse 80, temperature 97.8  F (36.6  C), temperature source Tympanic, resp. rate 18, height 1.74 m (5' 8.5\"), weight 141.1 kg (311 lb), not currently breastfeeding. BMI=Body mass index is 46.6 kg/m .  GENERAL APPEARANCE ADULT: Alert, no acute distress  HENT: oral mucous membranes moist, oropharynx clear  Dentition appeared fine she has no dentures or partials.  NECK: No adenopathy,masses or thyromegaly  There was no clicking with opening her mouth today.  There was no palpable abnormality in either TMJ area and no tenderness to palpation.    ===============================  She eats 0-1 servings of fruits and vegetables daily.She consumes 1 sweetened beverage(s) daily.She exercises with enough effort to increase her heart rate 9 or less minutes per day.  She exercises with enough effort to increase her heart rate 3 or less days per week.   She is taking medications regularly.           "

## 2022-10-20 NOTE — PATIENT INSTRUCTIONS
ASSESSMENT:   (M26.996) Temporomandibular joint disorder  (primary encounter diagnosis)  Comment: new onset in the past 2 weeks of clicking but no pain.  No known injury, no dental work recently.   Plan:   I expect this will likely improve in the next few weeks.   Give your jaw joint a rest-avoid gum chewing, opening mouth very wide like eating huge subs or burgers.   You could try anti-inflammatory medication like increasing your Aleve from one per day to two pills twice daily for 7-10 days.   Take with food so stomach is not irritated.   Bite block could help in case of teeth grinding.   IF persistent or pain is bothersome, I can do ENT physician referral.  You can send [employer] message or contact my staff.

## 2022-11-04 ENCOUNTER — TELEPHONE (OUTPATIENT)
Dept: FAMILY MEDICINE | Facility: CLINIC | Age: 57
End: 2022-11-04

## 2022-11-04 DIAGNOSIS — G89.29 CHRONIC BILATERAL LOW BACK PAIN WITH BILATERAL SCIATICA: Primary | ICD-10-CM

## 2022-11-04 DIAGNOSIS — G89.4 CHRONIC PAIN SYNDROME: ICD-10-CM

## 2022-11-04 DIAGNOSIS — M54.42 CHRONIC BILATERAL LOW BACK PAIN WITH BILATERAL SCIATICA: Primary | ICD-10-CM

## 2022-11-04 DIAGNOSIS — M54.41 CHRONIC BILATERAL LOW BACK PAIN WITH BILATERAL SCIATICA: Primary | ICD-10-CM

## 2022-11-04 RX ORDER — HYDROCODONE BITARTRATE AND ACETAMINOPHEN 5; 325 MG/1; MG/1
1 TABLET ORAL EVERY 6 HOURS PRN
Qty: 25 TABLET | Refills: 0 | Status: SHIPPED | OUTPATIENT
Start: 2022-11-04 | End: 2022-11-07

## 2022-11-04 NOTE — TELEPHONE ENCOUNTER
Medication Question or Refill    Contacts       Type Contact Phone/Fax    11/04/2022 02:31 PM CDT Phone (Incoming) Harry Hugoelaine VIGIL (Self) [phone] (M)          What medication are you calling about (include dose and sig)?: Vicodan    Controlled Substance Agreement on file:   CSA -- Patient Level:     [Media Unavailable] Controlled Substance Agreement - Opioid - Scan on 9/20/2022  2:59 PM       Who prescribed the medication?: Rebecca aCrcamo    Do you need a refill? Yes:     When did you use the medication last? Yes    Patient offered an appointment? No    Do you have any questions or concerns?  No    Preferred Pharmacy:     [facility] [address] [address] [address]  Phone: [phone] Fax: [phone]    Could we send this information to you in [facility] or would you prefer to receive a phone call?:   Patient would prefer a phone call   Okay to leave a detailed message?: Yes at Home number on file There is no home phone number on file.

## 2022-11-04 NOTE — TELEPHONE ENCOUNTER
Pt called, she said that Dr Carcamo put documentation in pt visit 9/20/2022 to okay hydrocodone refill. Pt had previously had refills through [facility] provider, pt insurance changed and she now sees [facility] provider. Message forwarded to another provider to consider. Renetta Ambrosio RN

## 2022-11-04 NOTE — TELEPHONE ENCOUNTER
Left message for patient to return call. Who normally fills her Norco? It is historical in Medication tab. Would need to be seen for pain med refill.  Ingrid FOREMAN RN

## 2022-11-29 ENCOUNTER — ALLIED HEALTH/NURSE VISIT (OUTPATIENT)
Dept: EDUCATION SERVICES | Facility: CLINIC | Age: 57
End: 2022-11-29
Payer: COMMERCIAL

## 2022-11-29 VITALS — WEIGHT: 293 LBS | BODY MASS INDEX: 45.25 KG/M2

## 2022-11-29 DIAGNOSIS — E11.9 TYPE 2 DIABETES MELLITUS WITHOUT COMPLICATION, WITHOUT LONG-TERM CURRENT USE OF INSULIN (H): Primary | ICD-10-CM

## 2022-11-29 PROCEDURE — G0108 DIAB MANAGE TRN  PER INDIV: HCPCS | Performed by: DIETITIAN, REGISTERED

## 2022-11-29 NOTE — PATIENT INSTRUCTIONS
Work out plan:  Monday early evening, Tuesday (day off), Saturday, Sunday.   Will start with 10 minutes.    2.  Diabetes check up in January with PCP    3.  Lab end of December for a1c and microalbumin (urine).    4.  Eye exam, dilated part.

## 2022-11-29 NOTE — PROGRESS NOTES
Diabetes Self-Management Education & Support    Presents for: Individual review    Type of Service: In Person Visit    Assessment Type:   ASSESSMENT:  -doing well, wt is down 14 lbs.  -is watching her carbs and portions, she has started hello fresh and it is helping her get more veggies in meal plan, gets two meals out of it.  -discussed exercise today, she worked out once.  She is going to try for 4 times a week for 10 minutes  -went over complications of diabetes today  Patient's most recent   Lab Results   Component Value Date    A1C 7.4 09/20/2022     is not meeting goal of <7.0    Diabetes knowledge and skills assessment:   Patient is knowledgeable in diabetes management concepts related to: Healthy Eating, Being Active and Monitoring    Continue education with the following diabetes management concepts: Healthy Eating, Being Active, Monitoring, Taking Medication, Problem Solving, Reducing Risks and Healthy Coping    Based on learning assessment above, most appropriate setting for further diabetes education would be: Individual setting.      PLAN  1. Work out plan:  Monday early evening, Tuesday (day off), Saturday, Sunday.   Will start with 10 minutes.    2.  Diabetes check up in January with PCP    3.  Lab end of December for a1c and microalbumin (urine).    4.  Eye exam, dilated part.    Follow-up: as needed yearly    See Care Plan for co-developed, patient-state behavior change goals.  AVS provided for patient today.    Education Materials Provided:  No new materials provided today      SUBJECTIVE/OBJECTIVE:  Presents for: Individual review  Diabetes education in the past 24mo: No  Diabetes type: Type 2  Disease course: Other  How confident are you filling out medical forms by yourself:: Extremely  Diabetes management related comments/concerns: I don t know if I m a type one or type two.  Cultural Influences/Ethnic Background:  Not  or       Diabetes Symptoms & Complications:  Fatigue:  "Sometimes  Neuropathy: No  Polydipsia: Yes  Polyphagia: No  Polyuria: Yes  Visual change: Sometimes  Slow healing wounds: No  Autonomic neuropathy: No  CVA: No  Heart disease: No  Nephropathy: No  Peripheral neuropathy: No  Peripheral Vascular Disease: No  Retinopathy: No  Sexual dysfunction: No    Patient Problem List and Family Medical History reviewed for relevant medical history, current medical status, and diabetes risk factors.    Vitals:  Wt 137 kg (302 lb)   LMP  (LMP Unknown)   BMI 45.25 kg/m    Estimated body mass index is 45.25 kg/m  as calculated from the following:    Height as of 10/20/22: 1.74 m (5' 8.5\").    Weight as of this encounter: 137 kg (302 lb).   Last 3 BP:   BP Readings from Last 3 Encounters:   10/20/22 130/80   09/20/22 138/72   06/01/22 134/82       History   Smoking Status     Former     Years: 8.00     Types: Cigarettes     Start date: 8/1/1982     Quit date: 12/31/1990   Smokeless Tobacco     Never       Labs:  Lab Results   Component Value Date    A1C 7.4 09/20/2022     Lab Results   Component Value Date     11/06/2013     No results found for: LDL  No results found for: HDL]  No results found for: GFRESTIMATED  No results found for: GFRESTBLACK  No results found for: CR  No results found for: MICROALBUMIN    Healthy Eating:  Healthy Eating Assessed Today: Yes  Cultural/[place] diet restrictions?: No  Meal planning/habits: None, Frequent snacking  How many times a week on average do you eat food made away from home (restaurant/take-out)?: 1  Meals include: Lunch, Dinner  Breakfast: smoothies with berries on work days or 1 cup cereal with 1/2 cup milk or barreto ,eggs and toast.  did do some light bread.  Lunch: leftover hellp fresh or frozen entree and salads wiht chicken cranberries and nuts.  Dinner: hello fresh or salad or frozen dinner or pizza (ultra thin crust and eating 1/4 of it)  Snacks: doing smaller bowl of ice cream.  Other: has lost 14 lbs.  worked out " once.  Beverages: Water, Milk, Diet soda    Being Active:  Being Active Assessed Today: Yes (worked out once.)  Exercise::  (has work out tapes she can use.  she will work on that)  Barrier to exercise: None    Monitoring:  Blood Glucose Meter: Unknown  Times checking blood sugar at home (number): Never  Blood glucose trend: Other        Taking Medications:      Current Treatments: None    Problem Solving:                 Reducing Risks:  CAD Risks: Diabetes Mellitus, Dyslipidemia, Obesity, Post-menopausal, Sedentary lifestyle  Has dilated eye exam at least once a year?: No  Sees dentist every 6 months?: Yes  Feet checked by healthcare provider in the last year?: No    Healthy Coping:  Informal Support system:: Family, Friends  Patient Activation Measure Survey Score:  No flowsheet data found.      Care Plan and Education Provided:  There are no care plans that you recently modified to display for this patient.          Time Spent: 40 minutes  Encounter Type: Individual    Any diabetes medication dose changes were made via the CDE Protocol per the patient's primary care provider. A copy of this encounter was shared with the provider.

## 2022-12-21 ENCOUNTER — LAB (OUTPATIENT)
Dept: FAMILY MEDICINE | Facility: CLINIC | Age: 57
End: 2022-12-21
Attending: FAMILY MEDICINE
Payer: COMMERCIAL

## 2022-12-21 DIAGNOSIS — F33.0 MAJOR DEPRESSIVE DISORDER, RECURRENT, MILD (H): ICD-10-CM

## 2022-12-21 DIAGNOSIS — F33.1 MAJOR DEPRESSIVE DISORDER, RECURRENT EPISODE, MODERATE (H): ICD-10-CM

## 2022-12-21 DIAGNOSIS — M54.42 LUMBAGO WITH SCIATICA, LEFT SIDE: ICD-10-CM

## 2022-12-21 DIAGNOSIS — Z20.822 SUSPECTED 2019 NOVEL CORONAVIRUS INFECTION: ICD-10-CM

## 2022-12-21 DIAGNOSIS — G89.4 CHRONIC PAIN SYNDROME: ICD-10-CM

## 2022-12-21 LAB — SARS-COV-2 RNA RESP QL NAA+PROBE: NEGATIVE

## 2022-12-21 PROCEDURE — 99207 PR NO CHARGE LOS: CPT

## 2022-12-21 PROCEDURE — U0005 INFEC AGEN DETEC AMPLI PROBE: HCPCS

## 2022-12-21 PROCEDURE — U0003 INFECTIOUS AGENT DETECTION BY NUCLEIC ACID (DNA OR RNA); SEVERE ACUTE RESPIRATORY SYNDROME CORONAVIRUS 2 (SARS-COV-2) (CORONAVIRUS DISEASE [COVID-19]), AMPLIFIED PROBE TECHNIQUE, MAKING USE OF HIGH THROUGHPUT TECHNOLOGIES AS DESCRIBED BY CMS-2020-01-R: HCPCS

## 2022-12-21 RX ORDER — HYDROCODONE BITARTRATE AND ACETAMINOPHEN 5; 325 MG/1; MG/1
TABLET ORAL
Qty: 25 TABLET | Refills: 0 | Status: SHIPPED | OUTPATIENT
Start: 2022-12-21 | End: 2023-03-28

## 2022-12-21 RX ORDER — BUPROPION HYDROCHLORIDE 150 MG/1
TABLET, EXTENDED RELEASE ORAL
Qty: 270 TABLET | Refills: 1 | Status: SHIPPED | OUTPATIENT
Start: 2022-12-21 | End: 2023-06-13

## 2022-12-21 RX ORDER — GABAPENTIN 100 MG/1
CAPSULE ORAL
Qty: 180 CAPSULE | Refills: 0 | Status: SHIPPED | OUTPATIENT
Start: 2022-12-21 | End: 2023-02-07

## 2022-12-21 NOTE — TELEPHONE ENCOUNTER
Last Written Prescription Date for hydrocodone:  11/4/22  Last Fill Quantity: 25,  # refills: 0   Has appointment with Dr Carcamo on 1/10/23  Future Office Visit:   Next 5 appointments (look out 90 days)    Dec 27, 2022 10:30 AM  Lab visit with [facility]  [facility] ([facility] ) [address] [place] [id number]  [phone]

## 2022-12-21 NOTE — TELEPHONE ENCOUNTER
Refilled both, but these should last until appt with Dr. Carcamo. If not, she then needs to be seen in clinic for a refill with another provider.    Reddy Wagner PA-C

## 2022-12-27 ENCOUNTER — DOCUMENTATION ONLY (OUTPATIENT)
Dept: LAB | Facility: CLINIC | Age: 57
End: 2022-12-27

## 2022-12-27 ENCOUNTER — LAB (OUTPATIENT)
Dept: LAB | Facility: CLINIC | Age: 57
End: 2022-12-27
Payer: COMMERCIAL

## 2022-12-27 DIAGNOSIS — E11.9 TYPE 2 DIABETES MELLITUS WITHOUT COMPLICATION, WITHOUT LONG-TERM CURRENT USE OF INSULIN (H): ICD-10-CM

## 2022-12-27 LAB
CREAT UR-MCNC: 143.5 MG/DL
HBA1C MFR BLD: 7 % (ref 0–5.6)
MICROALBUMIN UR-MCNC: 12.3 MG/L
MICROALBUMIN/CREAT UR: 8.57 MG/G CR (ref 0–25)

## 2022-12-27 PROCEDURE — 82570 ASSAY OF URINE CREATININE: CPT

## 2022-12-27 PROCEDURE — 36415 COLL VENOUS BLD VENIPUNCTURE: CPT

## 2022-12-27 PROCEDURE — 83036 HEMOGLOBIN GLYCOSYLATED A1C: CPT

## 2022-12-27 PROCEDURE — 82043 UR ALBUMIN QUANTITATIVE: CPT

## 2022-12-27 NOTE — PROGRESS NOTES
Ruthy MUSA Hugo has an upcoming lab appointment:    Future Appointments   Date Time Provider Department Center   12/27/2022 10:30 AM [facility]   1/10/2023 10:20 AM Rebecca Carcamo MD [facility]     Patient is scheduled for the following lab(s): a1c patient coming in this morning. The future a1c in chart is not due until end of February. Please place new order asap.    There is no order available. Please review and place either future orders or HMPO (Review of Health Maintenance Protocol Orders), as appropriate.    Health Maintenance Due   Topic     BMP      MICROALBUMIN      ANNUAL REVIEW OF HM ORDERS      A1C      LIPID      Maggie Ramírez

## 2023-02-07 ENCOUNTER — MYC MEDICAL ADVICE (OUTPATIENT)
Dept: FAMILY MEDICINE | Facility: CLINIC | Age: 58
End: 2023-02-07
Payer: COMMERCIAL

## 2023-02-07 DIAGNOSIS — M54.41 CHRONIC BILATERAL LOW BACK PAIN WITH BILATERAL SCIATICA: ICD-10-CM

## 2023-02-07 DIAGNOSIS — M54.42 CHRONIC BILATERAL LOW BACK PAIN WITH BILATERAL SCIATICA: ICD-10-CM

## 2023-02-07 DIAGNOSIS — G89.4 CHRONIC PAIN SYNDROME: Primary | ICD-10-CM

## 2023-02-07 DIAGNOSIS — G89.29 CHRONIC BILATERAL LOW BACK PAIN WITH BILATERAL SCIATICA: ICD-10-CM

## 2023-02-07 DIAGNOSIS — F33.0 MAJOR DEPRESSIVE DISORDER, RECURRENT, MILD (H): ICD-10-CM

## 2023-02-07 RX ORDER — GABAPENTIN 100 MG/1
100-200 CAPSULE ORAL AT BEDTIME
Qty: 180 CAPSULE | Refills: 1 | Status: SHIPPED | OUTPATIENT
Start: 2023-02-07 | End: 2023-10-14

## 2023-02-07 RX ORDER — CYCLOBENZAPRINE HCL 10 MG
10 TABLET ORAL 3 TIMES DAILY PRN
Qty: 270 TABLET | Refills: 0 | Status: SHIPPED | OUTPATIENT
Start: 2023-02-07 | End: 2023-06-13

## 2023-02-13 ENCOUNTER — OFFICE VISIT (OUTPATIENT)
Dept: FAMILY MEDICINE | Facility: CLINIC | Age: 58
End: 2023-02-13
Payer: COMMERCIAL

## 2023-02-13 VITALS
DIASTOLIC BLOOD PRESSURE: 80 MMHG | HEART RATE: 108 BPM | SYSTOLIC BLOOD PRESSURE: 138 MMHG | RESPIRATION RATE: 12 BRPM | WEIGHT: 293 LBS | TEMPERATURE: 97.6 F | BODY MASS INDEX: 44.59 KG/M2

## 2023-02-13 DIAGNOSIS — M79.652 PAIN OF LEFT THIGH: Primary | ICD-10-CM

## 2023-02-13 PROCEDURE — 99214 OFFICE O/P EST MOD 30 MIN: CPT | Performed by: FAMILY MEDICINE

## 2023-02-13 ASSESSMENT — ENCOUNTER SYMPTOMS
MYALGIAS: 1
HEMATOLOGIC/LYMPHATIC NEGATIVE: 1
EYES NEGATIVE: 1
CARDIOVASCULAR NEGATIVE: 1
GASTROINTESTINAL NEGATIVE: 1
ENDOCRINE NEGATIVE: 1
ARTHRALGIAS: 1
PSYCHIATRIC NEGATIVE: 1
CONSTITUTIONAL NEGATIVE: 1
ALLERGIC/IMMUNOLOGIC NEGATIVE: 1
RESPIRATORY NEGATIVE: 1

## 2023-02-13 ASSESSMENT — PAIN SCALES - GENERAL: PAINLEVEL: MODERATE PAIN (4)

## 2023-02-13 NOTE — LETTER
My Depression Action Plan  Name: Ruthy Hugo   Date of Birth 1965  Date: 2/13/2023    My doctor: Rebecca Carcamo   My clinic: [facility]  [address] [address]  [phone]          GREEN    ZONE   Good Control    What it looks like:     Things are going generally well. You have normal up s and down s. You may even feel depressed from time to time, but bad moods usually last less than a day.   What you need to do:  1. Continue to care for yourself (see self care plan)  2. Check your depression survival kit and update it as needed  3. Follow your physician s recommendations including any medication.  4. Do not stop taking medication unless you consult with your physician first.           YELLOW         ZONE Getting Worse    What it looks like:     Depression is starting to interfere with your life.     It may be hard to get out of bed; you may be starting to isolate yourself from others.    Symptoms of depression are starting to last most all day and this has happened for several days.     You may have suicidal thoughts but they are not constant.   What you need to do:     1. Call your care team, your response to treatment will improve if you keep your care team informed of your progress. Yellow periods are signs an adjustment may need to be made.     2. Continue your self-care, even if you have to fake it!    3. Talk to someone in your support network    4. Open up your depression survival kit           RED    ZONE Medical Alert - Get Help    What it looks like:     Depression is seriously interfering with your life.     You may experience these or other symptoms: You can t get out of bed most days, can t work or engage in other necessary activities, you have trouble taking care of basic hygiene, or basic responsibilities, thoughts of suicide or death that will not go away, self-injurious behavior.     What you need to do:  1. Call your care team and  request a same-day appointment. If they are not available (weekends or after hours) call your local crisis line, emergency room or 911.        Depression Self Care Plan / Survival Kit    Self-Care for Depression  Here s the deal. Your body and mind are really not as separate as most people think.  What you do and think affects how you feel and how you feel influences what you do and think. This means if you do things that people who feel good do, it will help you feel better.  Sometimes this is all it takes.  There is also a place for medication and therapy depending on how severe your depression is, so be sure to consult with your medical provider and/ or Behavioral Health Consultant if your symptoms are worsening or not improving.     In order to better manage my stress, I will:    Exercise  Get some form of exercise, every day. This will help reduce pain and release endorphins, the  feel good  chemicals in your brain. This is almost as good as taking antidepressants!  This is not the same as joining a gym and then never going! (they count on that by the way ) It can be as simple as just going for a walk or doing some gardening, anything that will get you moving.      Hygiene   Maintain good hygiene (Get out of bed in the morning, Make your bed, Brush your teeth, Take a shower, and Get dressed like you were going to work, even if you are unemployed).  If your clothes don't fit try to get ones that do.    Diet  I will strive to eat foods that are good for me, drink plenty of water, and avoid excessive sugar, caffeine, alcohol, and other mood-altering substances.  Some foods that are helpful in depression are: complex carbohydrates, B vitamins, flaxseed, fish or fish oil, fresh fruits and vegetables.    Psychotherapy  I agree to participate in Individual Therapy (if recommended).    Medication  If prescribed medications, I agree to take them.  Missing doses can result in serious side effects.  I understand that  drinking alcohol, or other illicit drug use, may cause potential side effects.  I will not stop my medication abruptly without first discussing it with my provider.    Staying Connected With Others  I will stay in touch with my friends, family members, and my primary care provider/team.    Use your imagination  Be creative.  We all have a creative side; it doesn t matter if it s oil painting, sand castles, or mud pies! This will also kick up the endorphins.    Witness Beauty  (AKA stop and smell the roses) Take a look outside, even in mid-winter. Notice colors, textures. Watch the squirrels and birds.     Service to others  Be of service to others.  There is always someone else in need.  By helping others we can  get out of ourselves  and remember the really important things.  This also provides opportunities for practicing all the other parts of the program.    Humor  Laugh and be silly!  Adjust your TV habits for less news and crime-drama and more comedy.    Control your stress  Try breathing deep, massage therapy, biofeedback, and meditation. Find time to relax each day.     My support system    Clinic Contact:  Phone number:    Contact 1:  Phone number:    Contact 2:  Phone number:    [employer]/:  Phone number:    Therapist:  Phone number:    Local crisis center:    Phone number:    Other community support:  Phone number:
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
## 2023-02-14 ENCOUNTER — HOSPITAL ENCOUNTER (OUTPATIENT)
Dept: MRI IMAGING | Facility: CLINIC | Age: 58
Discharge: HOME | End: 2023-02-14
Attending: FAMILY MEDICINE | Admitting: FAMILY MEDICINE
Payer: COMMERCIAL

## 2023-02-14 DIAGNOSIS — M79.652 PAIN OF LEFT THIGH: ICD-10-CM

## 2023-02-14 PROCEDURE — 73718 MRI LOWER EXTREMITY W/O DYE: CPT | Mod: 26 | Performed by: RADIOLOGY

## 2023-02-14 PROCEDURE — 73718 MRI LOWER EXTREMITY W/O DYE: CPT | Mod: LT

## 2023-02-16 DIAGNOSIS — S71.001S: Primary | ICD-10-CM

## 2023-02-16 DIAGNOSIS — S71.101S: Primary | ICD-10-CM

## 2023-02-21 ENCOUNTER — OFFICE VISIT (OUTPATIENT)
Dept: ORTHOPEDICS | Facility: CLINIC | Age: 58
End: 2023-02-21
Attending: FAMILY MEDICINE
Payer: COMMERCIAL

## 2023-02-21 ENCOUNTER — ANCILLARY PROCEDURE (OUTPATIENT)
Dept: GENERAL RADIOLOGY | Facility: CLINIC | Age: 58
End: 2023-02-21
Attending: PEDIATRICS
Payer: COMMERCIAL

## 2023-02-21 VITALS
HEART RATE: 103 BPM | DIASTOLIC BLOOD PRESSURE: 83 MMHG | SYSTOLIC BLOOD PRESSURE: 121 MMHG | BODY MASS INDEX: 44.5 KG/M2 | WEIGHT: 293 LBS

## 2023-02-21 DIAGNOSIS — M62.89 TENSOR FASCIA LATA SYNDROME: Primary | ICD-10-CM

## 2023-02-21 DIAGNOSIS — S72.002A: ICD-10-CM

## 2023-02-21 PROBLEM — S71.102A: Status: ACTIVE | Noted: 2023-02-21

## 2023-02-21 PROBLEM — S71.002A: Status: ACTIVE | Noted: 2023-02-21

## 2023-02-21 PROCEDURE — 73502 X-RAY EXAM HIP UNI 2-3 VIEWS: CPT | Mod: TC | Performed by: RADIOLOGY

## 2023-02-21 PROCEDURE — 99204 OFFICE O/P NEW MOD 45 MIN: CPT | Performed by: PEDIATRICS

## 2023-02-21 NOTE — PATIENT INSTRUCTIONS
We discussed these other possible diagnosis: Reviewed MRI with likely TFL avulsion, given improvement will start physical therapy.    Plan:  - Today's Plan of Care:  Rehab: Physical [facility]: [facility] - [phone]  Discussed activity considerations and other supportive care including Ice/Heat, OTC and other topical medications as needed.    Follow Up: 6 - 8 weeks and as needed    If you have any further questions for your physician or physician s care team you can call [phone] and use option 3 to leave a voice message.

## 2023-02-21 NOTE — PROGRESS NOTES
ASSESSMENT & PLAN    Ruthy was seen today for pain and pain.    Diagnoses and all orders for this visit:    Tensor fascia mamie syndrome  -     Orthopedic  Referral  -     XR Pelvis w Hip LT 1 View  -     Physical Therapy Referral; Future    Closed avulsion fracture of left hip, initial encounter (H)  -     Physical Therapy Referral; Future      This issue is acute and Improving.    We discussed these other possible diagnosis: Reviewed MRI with likely TFL avulsion, given improvement will start physical therapy.    Plan:  - Today's Plan of Care:  Rehab: Physical Therapy: [facility] - [phone]  Discussed activity considerations and other supportive care including Ice/Heat, OTC and other topical medications as needed.    Follow Up: 6 - 8 weeks and as needed    Concerning signs and symptoms were reviewed.  The patient expressed understanding of this management plan and all questions were answered at this time.    Thanks for the opportunity to participate in the care of this patient, I will keep you updated on their progress.    CC: Blanca Spear MD [facility]  Sports Medicine Physician  [facility]      -----  Chief Complaint   Patient presents with     Left Thigh - Pain     Left Hip - Pain       SUBJECTIVE  Ruthy Hugo is a/an 57 year old female who is seen in consultation at the request of  Blanca Donnelly M.D. for evaluation of left thigh, left hip injury    The patient is seen by themselves.    Onset: 9 day(s) ago. Patient describes injury as getting up from the floor, felt and heard a tear    Location of Pain: left thigh; patient states she is feeling pain distal greater trochanter, middle/lateral thigh  Worsened by: nothing currently, she's improving  - initially had pain with walking, used a cane, couldn't lift her leg so had pain with steps, in/out of the car, hip flexion    Better with: resting, avoiding activity, icing, use of  cane  Treatments tried: ice, Aleve, previous imaging (MRI 2/14/23) and casting/splinting/bracing  Associated symptoms: weakness of left leg/thigh    Orthopedic/Surgical history: Yes, bilateral knee replacement, 2013  Social History/Occupation: animal hospital,     No family history pertinent to patient's problem today.    REVIEW OF SYSTEMS:  Review of Systems  Skin: no bruising, some swelling  Musculoskeletal: as above  Neurologic: no numbness, paresthesias  Remainder of review of systems is negative including constitutional, CV, pulmonary, GI, except as noted in HPI or medical history.    OBJECTIVE:  /83   Pulse 103   Wt 134.7 kg (297 lb)   LMP  (LMP Unknown)   BMI 44.50 kg/m     General: healthy, alert and in no distress  HEENT: no scleral icterus or conjunctival erythema  Skin: no suspicious lesions or rash. No jaundice.  CV: distal perfusion intact  Resp: normal respiratory effort without conversational dyspnea   Psych: normal mood and affect  Gait: NORMAL  Neuro: Normal light sensory exam of lower extremity    Bilateral hip exam  Inspection:      no edema or ecchymosis in hip area    Tender:      Mild lateral thigh/femur    Non Tender:      remainder of the hip area bilateral    ROM:     Full active and passive ROM  bilateral    Strength:      flexion 5-/5 left       abduction 4+/5 left       adduction 5/5 bilateral    Sensation:      grossly intact in hip and thigh    Special Tests:      neg (-) LINDY left       neg (-) FADIR left    RADIOLOGY:  I independently ordered, visualized and reviewed these images with the patient  AP Pelvis and left lateral hip XR views reviewed: small avulsion seen greater trochanter, no significant degenerative change  - will follow official read    Reviewed MRI of the left femur 2/14/2023 -tensor fascia mamie strain low-grade avulsion and tearing at the medial aspect with subcutaneous fluid and edema mid to distal thigh likely contusion    Results for  orders placed or performed during the hospital encounter of 02/14/23   MR Femur Thigh Left wo Contrast    Narrative    Exam: MR FEMUR THIGH LEFT W/O CONTRAST    History: Pain of the left thigh.    Additional history from EMR: Injury yesterday afternoon.    Techniques: Multiplanar multisequence imaging through the left thigh  was obtained without administration of intra-articular or intravenous  gadolinium contrast  using routine protocol.    Comparison: None.    Findings:    Bones:    No fracture, bone contusion or marrow infiltrative change.    Muscles: Tensor fascia mamie strain with low-grade intramuscular  tearing/partial avulsion proximally at medial aspect. Small  hypointense focus (image 6 axial), maybe small focus of blood product  vs. avulsion fragment. Associated overlying subcutaneous edema.  Additionally, subcutaneous fluid/edema along the mid/distal lateral  thigh, likely representing area of contusion.    Internal derangement of joints are not well assessed owing to chosen  field-of-view. A physiologic amount of joint fluid is present in the  left hip. Trace subgluteus minimus bursal fluid.    Postsurgical changes of left total knee arthroplasty with metallic  susceptibility artifacts.    Evaluation contralateral right thigh evaluation confined to the large  field-of-view coronal sequences demonstrate no fracture, contusion or  marrow infiltrative change. Right knee arthroplasty changes with  susceptibility artifact.       Impression    Impression:  1. Tensor fascia mamie strain with low-grade intramuscular  tearing/partial avulsion proximally at medial aspect.   2. Additional subcutaneous fluid/edema along the mid/distal lateral  thigh, likely representing area of contusion.    AMY [employer]         SYSTEM ID:  [id number]         Review of prior external note(s) from - PCP  Review of the result(s) of each unique test - XR and MRI  Independent interpretation of a test performed by another  physician/other qualified health care professional (not separately reported) - MRI  Ordering of each unique test

## 2023-02-21 NOTE — LETTER
2/21/2023         RE: Ruthy Hugo  [id number]  [place] [address]        Dear Colleague,    Thank you for referring your patient, Ruthy Hugo, to the [facility] [place]. Please see a copy of my visit note below.    ASSESSMENT & PLAN    Ruthy was seen today for pain and pain.    Diagnoses and all orders for this visit:    Tensor fascia mamie syndrome  -     Orthopedic  Referral  -     XR Pelvis w Hip LT 1 View  -     Physical Therapy Referral; Future    Closed avulsion fracture of left hip, initial encounter (H)  -     Physical Therapy Referral; Future      This issue is acute and Improving.    We discussed these other possible diagnosis: Reviewed MRI with likely TFL avulsion, given improvement will start physical therapy.    Plan:  - Today's Plan of Care:  Rehab: Physical Therapy: [facility] - [phone]  Discussed activity considerations and other supportive care including Ice/Heat, OTC and other topical medications as needed.    Follow Up: 6 - 8 weeks and as needed    Concerning signs and symptoms were reviewed.  The patient expressed understanding of this management plan and all questions were answered at this time.    Thanks for the opportunity to participate in the care of this patient, I will keep you updated on their progress.    CC: Blanca Spear MD [facility]  Sports Medicine Physician  [facility]      -----  Chief Complaint   Patient presents with     Left Thigh - Pain     Left Hip - Pain       SUBJECTIVE  Ruthy Hugo is a/an 57 year old female who is seen in consultation at the request of  Blanca Donnelly M.D. for evaluation of left thigh, left hip injury    The patient is seen by themselves.    Onset: 9 day(s) ago. Patient describes injury as getting up from the floor, felt and heard a tear    Location of Pain: left thigh; patient states she is feeling pain distal greater  trochanter, middle/lateral thigh  Worsened by: nothing currently, she's improving  - initially had pain with walking, used a cane, couldn't lift her leg so had pain with steps, in/out of the car, hip flexion    Better with: resting, avoiding activity, icing, use of cane  Treatments tried: ice, Aleve, previous imaging (MRI 2/14/23) and casting/splinting/bracing  Associated symptoms: weakness of left leg/thigh    Orthopedic/Surgical history: Yes, bilateral knee replacement, 2013  Social History/Occupation: animal hospital,     No family history pertinent to patient's problem today.    REVIEW OF SYSTEMS:  Review of Systems  Skin: no bruising, some swelling  Musculoskeletal: as above  Neurologic: no numbness, paresthesias  Remainder of review of systems is negative including constitutional, CV, pulmonary, GI, except as noted in HPI or medical history.    OBJECTIVE:  /83   Pulse 103   Wt 134.7 kg (297 lb)   LMP  (LMP Unknown)   BMI 44.50 kg/m     General: healthy, alert and in no distress  HEENT: no scleral icterus or conjunctival erythema  Skin: no suspicious lesions or rash. No jaundice.  CV: distal perfusion intact  Resp: normal respiratory effort without conversational dyspnea   Psych: normal mood and affect  Gait: NORMAL  Neuro: Normal light sensory exam of lower extremity    Bilateral hip exam  Inspection:      no edema or ecchymosis in hip area    Tender:      Mild lateral thigh/femur    Non Tender:      remainder of the hip area bilateral    ROM:     Full active and passive ROM  bilateral    Strength:      flexion 5-/5 left       abduction 4+/5 left       adduction 5/5 bilateral    Sensation:      grossly intact in hip and thigh    Special Tests:      neg (-) LINDY left       neg (-) FADIR left    RADIOLOGY:  I independently ordered, visualized and reviewed these images with the patient  AP Pelvis and left lateral hip XR views reviewed: small avulsion seen greater trochanter, no  significant degenerative change  - will follow official read    Reviewed MRI of the left femur 2/14/2023 -tensor fascia mamie strain low-grade avulsion and tearing at the medial aspect with subcutaneous fluid and edema mid to distal thigh likely contusion    Results for orders placed or performed during the hospital encounter of 02/14/23   MR Femur Thigh Left wo Contrast    Narrative    Exam: MR FEMUR THIGH LEFT W/O CONTRAST    History: Pain of the left thigh.    Additional history from EMR: Injury yesterday afternoon.    Techniques: Multiplanar multisequence imaging through the left thigh  was obtained without administration of intra-articular or intravenous  gadolinium contrast  using routine protocol.    Comparison: None.    Findings:    Bones:    No fracture, bone contusion or marrow infiltrative change.    Muscles: Tensor fascia mamie strain with low-grade intramuscular  tearing/partial avulsion proximally at medial aspect. Small  hypointense focus (image 6 axial), maybe small focus of blood product  vs. avulsion fragment. Associated overlying subcutaneous edema.  Additionally, subcutaneous fluid/edema along the mid/distal lateral  thigh, likely representing area of contusion.    Internal derangement of joints are not well assessed owing to chosen  field-of-view. A physiologic amount of joint fluid is present in the  left hip. Trace subgluteus minimus bursal fluid.    Postsurgical changes of left total knee arthroplasty with metallic  susceptibility artifacts.    Evaluation contralateral right thigh evaluation confined to the large  field-of-view coronal sequences demonstrate no fracture, contusion or  marrow infiltrative change. Right knee arthroplasty changes with  susceptibility artifact.       Impression    Impression:  1. Tensor fascia mamie strain with low-grade intramuscular  tearing/partial avulsion proximally at medial aspect.   2. Additional subcutaneous fluid/edema along the mid/distal lateral  thigh,  likely representing area of contusion.    AMY [employer]ower         SYSTEM ID:  [id number]         Review of prior external note(s) from - PCP  Review of the result(s) of each unique test - XR and MRI  Independent interpretation of a test performed by another physician/other qualified health care professional (not separately reported) - MRI  Ordering of each unique test             Again, thank you for allowing me to participate in the care of your patient.        Sincerely,        Yvonne Spear MD

## 2023-03-01 ENCOUNTER — HOSPITAL ENCOUNTER (OUTPATIENT)
Dept: PHYSICAL THERAPY | Facility: CLINIC | Age: 58
Setting detail: THERAPIES SERIES
Discharge: HOME | End: 2023-03-01
Attending: PEDIATRICS
Payer: COMMERCIAL

## 2023-03-01 DIAGNOSIS — M62.89 TENSOR FASCIA LATA SYNDROME: ICD-10-CM

## 2023-03-01 DIAGNOSIS — S72.002A: ICD-10-CM

## 2023-03-01 PROCEDURE — 97110 THERAPEUTIC EXERCISES: CPT | Mod: GP | Performed by: PHYSICAL THERAPIST

## 2023-03-01 PROCEDURE — 97161 PT EVAL LOW COMPLEX 20 MIN: CPT | Mod: GP | Performed by: PHYSICAL THERAPIST

## 2023-03-01 NOTE — PROGRESS NOTES
03/01/23 1100   General Information   Type of Visit Initial OP Ortho PT Evaluation   Start of Care Date 03/01/23   Referring Physician Yvonne Spear   Patient/Family Goals Statement improve strength   Orders Evaluate and Treat   Date of Order 02/21/23   Certification Required? No   Medical Diagnosis Closed avulsion fracture of left hip, initial encounter (H) (S72.002A)     Tensor fascia mamie syndrome (M62.89)   Surgical/Medical history reviewed Yes   Precautions/Limitations no known precautions/limitations   General Information Comments PMH: depression, DM, arthritis, B TKA, tendon repair L foot,   Body Part(s)   Body Part(s) Hip   Presentation and Etiology   Pertinent history of current problem (include personal factors and/or comorbidities that impact the POC) Got up off the floor on 2/12 and heard a pop in her hip.  Had an MRI and they found small grade avulsion fracture of the hip.  No longer having pain in the hip. Mostly worried about strength.   Impairments F. Decreased strength and endurance   Functional Limitations perform activities of daily living   Symptom Location left hip   How/Where did it occur At home;Other  (with getting up off floor)   Onset date of current episode/exacerbation 02/12/23   Chronicity New   Pain rating (0-10 point scale) Denies pain   Best (/10) 0   Worst (/10) 0   Current / Previous Interventions   Diagnostic Tests: X-ray;MRI   X-ray Results Results   X-ray results IMPRESSION: Both hips are negative for fracture. Pelvis negative for  fracture.   MRI Results Results   MRI results Reviewed MRI of the left femur 2/14/2023 -tensor fascia mamie strain low-grade avulsion and tearing at the medial aspect with subcutaneous fluid and edema mid to distal thigh likely contusion   Current Level of Function   Patient role/employment history A. Employed   Employment Comments stand/sit: works at animal hospital   Fall Risk Screen   Fall screen completed by PT   Have you fallen 2 or more times  in the past year? No   Have you fallen and had an injury in the past year? No   Is patient a fall risk? No   Abuse Screen (yes response referral indicated)   Feels Unsafe at Home or Work/School no   Feels Threatened by Someone no   Does Anyone Try to Keep You From Having Contact with Others or Doing Things Outside Your Home? no   Physical Signs of Abuse Present no   Functional Scales   Functional Scales Other   Other Scales  LEFS   Hip Objective Findings   Gait/Locomotion non antalgic, normal   Side (if bilateral, select both right and left) Left   Palpation mild tenderness over greater trochanter on L   Obers/ITB Flexibility WNL mild pulling but no pain   Left Hamstring Flexibility 90/90 to 0   Left Piriformis Flexibility WNL   Left Hip Flexion PROM 120 no pain   Left Hip Abduction PROM 45 no pain   Left Hip Adduction PROM 30 no pain   Left Hip ER PROM 45 no pain   Left Hip IR PROM 40 no pain   Left Hip Ext PROM 10 degrees   Left Hip Flexion Strength 4-/5   Left Hip Abduction Strength 4-/5   Left Hip Extension Strength 4-/5   Left Hip IR Strength 4/5   Left Hip ER Strength 4+/5   Left Knee Flexion Strength 5/5   Left Knee Extension Strength 5/5   Planned Therapy Interventions   Planned Therapy Interventions balance training;bed mobility training;gait training;manual therapy;joint mobilization;neuromuscular re-education;ROM;stretching;strengthening;transfer training   Clinical Impression   Criteria for Skilled Therapeutic Interventions Met yes, treatment indicated   PT Diagnosis decreased L LE strength   Influenced by the following impairments decreased L LE strength   Functional limitations due to impairments strength, work   Clinical Presentation Stable/Uncomplicated   Clinical Presentation Rationale clinical decision making and chart review   Clinical Decision Making (Complexity) Low complexity   Therapy Frequency other (see comments)  (every 2-3 weeks)   Predicted Duration of Therapy Intervention (days/wks) 8  weeks   Risk & Benefits of therapy have been explained Yes   Patient, Family & other staff in agreement with plan of care Yes   Clinical Impression Comments Ruthy is a 57 year old female who presents to PT following a  MRI of the left femur 2/14/2023 -tensor fascia mamie strain low-grade avulsion and tearing at the medial aspect.  Patient presents to PT with no pain today and only complaint is mild weakness across the left leg. PT prognosis is very good with skilled PT intervention for improving weakness.   Education Assessment   Preferred Learning Style Listening;Demonstration;Pictures/video   Barriers to Learning No barriers   ORTHO GOALS   PT Ortho Eval Goals 1;2;3   Ortho Goal 1   Goal Identifier 1   Goal Description Patient will improve left hip flexion strength to 4+/5 in order to improve ease of repetitive tasks at work.   Target Date 03/29/23   Ortho Goal 2   Goal Identifier 2   Goal Description Patient will improve left hip abduction strength to 4+/5 in order to improve endurance of hip for walking.   Target Date 04/26/23   Ortho Goal 3   Goal Identifier 3   Goal Description Patient will be independent with HEP in order to continue strengthening outside of PT.   Target Date 04/26/23   Total Evaluation Time   PT Eval, Low Complexity Minutes (06506) 12     Soumya Voss  PT, DPT       3/1/2023   [place] [address]   [email or web address]  Voicemail: [phone]

## 2023-03-07 ENCOUNTER — TRANSFERRED RECORDS (OUTPATIENT)
Dept: HEALTH INFORMATION MANAGEMENT | Facility: CLINIC | Age: 58
End: 2023-03-07
Payer: COMMERCIAL

## 2023-03-07 LAB — RETINOPATHY: NEGATIVE

## 2023-03-09 ENCOUNTER — TELEPHONE (OUTPATIENT)
Dept: FAMILY MEDICINE | Facility: CLINIC | Age: 58
End: 2023-03-09
Payer: COMMERCIAL

## 2023-03-09 NOTE — TELEPHONE ENCOUNTER
Patient Quality Outreach    Patient is due for the following:   Diabetes -  Eye Exam    Next Steps:   Chart routed to abstraction.   sent to scanning.    Type of outreach:    Chart review performed, no outreach needed.      Questions for provider review:    None     Jennifer Restrepo MA

## 2023-03-28 ENCOUNTER — OFFICE VISIT (OUTPATIENT)
Dept: FAMILY MEDICINE | Facility: CLINIC | Age: 58
End: 2023-03-28
Payer: COMMERCIAL

## 2023-03-28 ENCOUNTER — HOSPITAL ENCOUNTER (OUTPATIENT)
Dept: PHYSICAL THERAPY | Facility: CLINIC | Age: 58
Setting detail: THERAPIES SERIES
Discharge: HOME | End: 2023-03-28
Attending: PEDIATRICS
Payer: COMMERCIAL

## 2023-03-28 VITALS
WEIGHT: 293 LBS | HEIGHT: 69 IN | BODY MASS INDEX: 43.4 KG/M2 | SYSTOLIC BLOOD PRESSURE: 124 MMHG | DIASTOLIC BLOOD PRESSURE: 78 MMHG | OXYGEN SATURATION: 96 % | TEMPERATURE: 97.8 F | HEART RATE: 90 BPM | RESPIRATION RATE: 16 BRPM

## 2023-03-28 DIAGNOSIS — E11.29 TYPE 2 DIABETES MELLITUS WITH MICROALBUMINURIA, WITHOUT LONG-TERM CURRENT USE OF INSULIN (H): Primary | ICD-10-CM

## 2023-03-28 DIAGNOSIS — M54.42 CHRONIC LEFT-SIDED LOW BACK PAIN WITH LEFT-SIDED SCIATICA: ICD-10-CM

## 2023-03-28 DIAGNOSIS — G89.29 CHRONIC LEFT-SIDED LOW BACK PAIN WITH LEFT-SIDED SCIATICA: ICD-10-CM

## 2023-03-28 DIAGNOSIS — R80.9 TYPE 2 DIABETES MELLITUS WITH MICROALBUMINURIA, WITHOUT LONG-TERM CURRENT USE OF INSULIN (H): Primary | ICD-10-CM

## 2023-03-28 DIAGNOSIS — G89.4 CHRONIC PAIN SYNDROME: ICD-10-CM

## 2023-03-28 DIAGNOSIS — R80.9 MICROALBUMINURIA: ICD-10-CM

## 2023-03-28 LAB
ANION GAP SERPL CALCULATED.3IONS-SCNC: 10 MMOL/L (ref 7–15)
BUN SERPL-MCNC: 18 MG/DL (ref 6–20)
CALCIUM SERPL-MCNC: 9.4 MG/DL (ref 8.6–10)
CHLORIDE SERPL-SCNC: 104 MMOL/L (ref 98–107)
CHOLEST SERPL-MCNC: 220 MG/DL
CREAT SERPL-MCNC: 1.17 MG/DL (ref 0.51–0.95)
DEPRECATED HCO3 PLAS-SCNC: 26 MMOL/L (ref 22–29)
GFR SERPL CREATININE-BSD FRML MDRD: 54 ML/MIN/1.73M2
GLUCOSE SERPL-MCNC: 136 MG/DL (ref 70–99)
HBA1C MFR BLD: 6.2 % (ref 0–5.6)
HDLC SERPL-MCNC: 57 MG/DL
LDLC SERPL CALC-MCNC: 130 MG/DL
NONHDLC SERPL-MCNC: 163 MG/DL
POTASSIUM SERPL-SCNC: 4.3 MMOL/L (ref 3.4–5.3)
SODIUM SERPL-SCNC: 140 MMOL/L (ref 136–145)
TRIGL SERPL-MCNC: 165 MG/DL
VIT B12 SERPL-MCNC: 522 PG/ML (ref 232–1245)

## 2023-03-28 PROCEDURE — 97110 THERAPEUTIC EXERCISES: CPT | Mod: GP | Performed by: PHYSICAL THERAPIST

## 2023-03-28 PROCEDURE — 99214 OFFICE O/P EST MOD 30 MIN: CPT | Performed by: STUDENT IN AN ORGANIZED HEALTH CARE EDUCATION/TRAINING PROGRAM

## 2023-03-28 PROCEDURE — 80048 BASIC METABOLIC PNL TOTAL CA: CPT | Performed by: STUDENT IN AN ORGANIZED HEALTH CARE EDUCATION/TRAINING PROGRAM

## 2023-03-28 PROCEDURE — 80061 LIPID PANEL: CPT | Performed by: STUDENT IN AN ORGANIZED HEALTH CARE EDUCATION/TRAINING PROGRAM

## 2023-03-28 PROCEDURE — 82607 VITAMIN B-12: CPT | Performed by: STUDENT IN AN ORGANIZED HEALTH CARE EDUCATION/TRAINING PROGRAM

## 2023-03-28 PROCEDURE — 83036 HEMOGLOBIN GLYCOSYLATED A1C: CPT | Performed by: STUDENT IN AN ORGANIZED HEALTH CARE EDUCATION/TRAINING PROGRAM

## 2023-03-28 PROCEDURE — 36415 COLL VENOUS BLD VENIPUNCTURE: CPT | Performed by: STUDENT IN AN ORGANIZED HEALTH CARE EDUCATION/TRAINING PROGRAM

## 2023-03-28 RX ORDER — HYDROCODONE BITARTRATE AND ACETAMINOPHEN 5; 325 MG/1; MG/1
1 TABLET ORAL EVERY 6 HOURS PRN
Qty: 25 TABLET | Refills: 0 | Status: SHIPPED | OUTPATIENT
Start: 2023-03-28 | End: 2023-06-13

## 2023-03-28 RX ORDER — LISINOPRIL 2.5 MG/1
2.5 TABLET ORAL DAILY
Qty: 90 TABLET | Refills: 3 | Status: SHIPPED | OUTPATIENT
Start: 2023-03-28 | End: 2023-08-15

## 2023-03-28 ASSESSMENT — PATIENT HEALTH QUESTIONNAIRE - PHQ9
SUM OF ALL RESPONSES TO PHQ QUESTIONS 1-9: 0
SUM OF ALL RESPONSES TO PHQ QUESTIONS 1-9: 0

## 2023-03-28 ASSESSMENT — PAIN SCALES - GENERAL: PAINLEVEL: MILD PAIN (2)

## 2023-03-28 NOTE — PROGRESS NOTES
[facility]    Outpatient Physical Therapy Discharge Note  Patient: Ruthy Hugo  : 1965    Beginning/End Dates of Reporting Period:  3/1/23 to 3/28/23    Referring Provider: Yvonne Rodriguez Diagnosis: decreased L LE strength      Client Self Report: Hasn't been doing the exercises a ton due to being busy but the hip has been feeling great. Basically feels like nothing ever happened.    Objective Measurements:  Objective Measure: strength  Details: left: hip flexion 4+/5 no pain,   hip abd 4/5       Goals:  Goal Identifier 1   Goal Description Patient will improve left hip flexion strength to 4+/5 in order to improve ease of repetitive tasks at work.   Target Date 23   Date Met  23   Progress (detail required for progress note):       Goal Identifier 2   Goal Description Patient will improve left hip abduction strength to 4+/5 in order to improve endurance of hip for walking.   Target Date 23   Date Met  23   Progress (detail required for progress note):       Goal Identifier 3   Goal Description Patient will be independent with HEP in order to continue strengthening outside of PT.   Target Date 23   Date Met  23   Progress (detail required for progress note):         Plan:  Discharge from therapy. Patient has not been doing exercises all that often but feels everything is going well at home. NOt having any pain over her avulsion fracture site. She plans to still try to do exercises but is okay with doing them independently moving forward.    Discharge:    Reason for Discharge: Patient has met all goals.    Equipment Issued: theraband    Discharge Plan: Patient to continue home program.

## 2023-03-28 NOTE — PROGRESS NOTES
"  Assessment & Plan     Patient is a very pleasant 57-year-old female presents today for follow-up on diabetes.  Patient was seen in the interim by another provider for diabetes care, had diabetes for exam completed with some decrease sensation on the right foot.  Told she has microalbuminuria, encouraged to start on lisinopril, which patient was not prescribed at that time.    Type 2 diabetes mellitus with microalbuminuria, without long-term current use of insulin (H)  Patient has been using the continuous glucose monitor, have generally having sugars within normal range.  She was told to have fasting lipids done, so she has been fasting.  We will recheck hemoglobin A1c today as well, as well as a B12 level in the context of using metformin.  Consider increasing metformin dose to full dose of 2000 mg daily pending A1c results.  - BASIC METABOLIC PANEL  - Lipid panel reflex to direct LDL Fasting  - HEMOGLOBIN A1C  - Vitamin B12    Microalbuminuria  For microalbuminuria, we discussed adding a very low-dose of lisinopril today for kidney protection, which she was interested in.  Will monitor for signs and symptoms of hypotension.  - lisinopril (ZESTRIL) 2.5 MG tablet  Dispense: 90 tablet; Refill: 3    Chronic pain syndrome  Chronic left-sided low back pain with left-sided sciatica  Refill of Saunemin today.  Last refill was in December.  - HYDROcodone-acetaminophen (NORCO) 5-325 MG tablet  Dispense: 25 tablet; Refill: 0         BMI:   Estimated body mass index is 44.35 kg/m  as calculated from the following:    Height as of this encounter: 1.74 m (5' 8.5\").    Weight as of this encounter: 134.3 kg (296 lb).   Did not discuss today     Rebecca Carcamo MD  [facility]    Mitch Bliss is a 57 year old, presenting for the following health issues:  Chief Complaint   Patient presents with     Diabetes     Back Pain     History of Present Illness       Diabetes:   She presents for follow up of " "diabetes.  She is checking home blood glucose with a continuous glucose monitor.  She checks blood glucose before meals, after meals, before and after meals and at bedtime.  Blood glucose is sometimes over 200 and sometimes under 70. When her blood glucose is low, the patient is asymptomatic for confusion, blurred vision, lethargy and reports not feeling dizzy, shaky, or weak.  She is concerned about other. She is having numbness in feet.         She eats 2-3 servings of fruits and vegetables daily.She consumes 0 sweetened beverage(s) daily.She exercises with enough effort to increase her heart rate 9 or less minutes per day.  She exercises with enough effort to increase her heart rate 3 or less days per week.   She is taking medications regularly.    Today's PHQ-9         PHQ-9 Total Score: 0    PHQ-9 Q9 Thoughts of better off dead/self-harm past 2 weeks :   Not at all           Started on metformin.   Using a janeen.   Sometimes not wearing for a week at a time.   Mostly all normal, will spike when \"eat something bad\"      Review of Systems   Constitutional, HEENT, cardiovascular, pulmonary, GI, , musculoskeletal, neuro, skin, endocrine and psych systems are negative, except as otherwise noted.      Objective    /78 (BP Location: Right arm, Patient Position: Sitting, Cuff Size: Adult Regular)   Pulse 90   Temp 97.8  F (36.6  C) (Tympanic)   Resp 16   Ht 1.74 m (5' 8.5\")   Wt 134.3 kg (296 lb)   LMP  (LMP Unknown)   SpO2 96%   BMI 44.35 kg/m    Body mass index is 44.35 kg/m .  Physical Exam  Constitutional:       Appearance: Normal appearance.   HENT:      Head: Normocephalic.   Eyes:      General: No scleral icterus.     Extraocular Movements: Extraocular movements intact.      Conjunctiva/sclera: Conjunctivae normal.   Cardiovascular:      Rate and Rhythm: Normal rate and regular rhythm.   Pulmonary:      Effort: Pulmonary effort is normal.      Breath sounds: Normal breath sounds.   Neurological: "      General: No focal deficit present.      Mental Status: She is alert and oriented to person, place, and time.           Results from this visitNo results found for any visits on 03/28/23.

## 2023-05-22 ENCOUNTER — MYC MEDICAL ADVICE (OUTPATIENT)
Dept: FAMILY MEDICINE | Facility: CLINIC | Age: 58
End: 2023-05-22
Payer: COMMERCIAL

## 2023-05-22 DIAGNOSIS — E66.01 CLASS 3 SEVERE OBESITY DUE TO EXCESS CALORIES WITH SERIOUS COMORBIDITY AND BODY MASS INDEX (BMI) OF 45.0 TO 49.9 IN ADULT (H): Primary | ICD-10-CM

## 2023-05-22 DIAGNOSIS — E66.813 CLASS 3 SEVERE OBESITY DUE TO EXCESS CALORIES WITH SERIOUS COMORBIDITY AND BODY MASS INDEX (BMI) OF 45.0 TO 49.9 IN ADULT (H): Primary | ICD-10-CM

## 2023-06-06 ASSESSMENT — ANXIETY QUESTIONNAIRES
2. NOT BEING ABLE TO STOP OR CONTROL WORRYING: NOT AT ALL
GAD7 TOTAL SCORE: 0
3. WORRYING TOO MUCH ABOUT DIFFERENT THINGS: NOT AT ALL
5. BEING SO RESTLESS THAT IT IS HARD TO SIT STILL: NOT AT ALL
4. TROUBLE RELAXING: NOT AT ALL
6. BECOMING EASILY ANNOYED OR IRRITABLE: NOT AT ALL
7. FEELING AFRAID AS IF SOMETHING AWFUL MIGHT HAPPEN: NOT AT ALL
GAD7 TOTAL SCORE: 0
1. FEELING NERVOUS, ANXIOUS, OR ON EDGE: NOT AT ALL
GAD7 TOTAL SCORE: 0
7. FEELING AFRAID AS IF SOMETHING AWFUL MIGHT HAPPEN: NOT AT ALL

## 2023-06-06 ASSESSMENT — PATIENT HEALTH QUESTIONNAIRE - PHQ9
SUM OF ALL RESPONSES TO PHQ QUESTIONS 1-9: 0
SUM OF ALL RESPONSES TO PHQ QUESTIONS 1-9: 0

## 2023-06-13 ENCOUNTER — OFFICE VISIT (OUTPATIENT)
Dept: FAMILY MEDICINE | Facility: CLINIC | Age: 58
End: 2023-06-13
Payer: COMMERCIAL

## 2023-06-13 ENCOUNTER — TELEPHONE (OUTPATIENT)
Dept: FAMILY MEDICINE | Facility: CLINIC | Age: 58
End: 2023-06-13

## 2023-06-13 VITALS
HEART RATE: 98 BPM | TEMPERATURE: 99 F | OXYGEN SATURATION: 98 % | SYSTOLIC BLOOD PRESSURE: 127 MMHG | WEIGHT: 289.2 LBS | DIASTOLIC BLOOD PRESSURE: 82 MMHG | BODY MASS INDEX: 42.83 KG/M2 | RESPIRATION RATE: 16 BRPM | HEIGHT: 69 IN

## 2023-06-13 DIAGNOSIS — R80.9 TYPE 2 DIABETES MELLITUS WITH MICROALBUMINURIA, WITHOUT LONG-TERM CURRENT USE OF INSULIN (H): Primary | ICD-10-CM

## 2023-06-13 DIAGNOSIS — G89.29 CHRONIC LEFT-SIDED LOW BACK PAIN WITH LEFT-SIDED SCIATICA: ICD-10-CM

## 2023-06-13 DIAGNOSIS — M54.42 CHRONIC LEFT-SIDED LOW BACK PAIN WITH LEFT-SIDED SCIATICA: ICD-10-CM

## 2023-06-13 DIAGNOSIS — F33.1 MAJOR DEPRESSIVE DISORDER, RECURRENT EPISODE, MODERATE (H): ICD-10-CM

## 2023-06-13 DIAGNOSIS — E66.813 CLASS 3 SEVERE OBESITY DUE TO EXCESS CALORIES WITH SERIOUS COMORBIDITY AND BODY MASS INDEX (BMI) OF 40.0 TO 44.9 IN ADULT (H): ICD-10-CM

## 2023-06-13 DIAGNOSIS — G89.4 CHRONIC PAIN SYNDROME: ICD-10-CM

## 2023-06-13 DIAGNOSIS — M54.42 CHRONIC BILATERAL LOW BACK PAIN WITH BILATERAL SCIATICA: ICD-10-CM

## 2023-06-13 DIAGNOSIS — G89.29 CHRONIC BILATERAL LOW BACK PAIN WITH BILATERAL SCIATICA: ICD-10-CM

## 2023-06-13 DIAGNOSIS — R80.9 MICROALBUMINURIA: ICD-10-CM

## 2023-06-13 DIAGNOSIS — M54.41 CHRONIC BILATERAL LOW BACK PAIN WITH BILATERAL SCIATICA: ICD-10-CM

## 2023-06-13 DIAGNOSIS — E66.01 CLASS 3 SEVERE OBESITY DUE TO EXCESS CALORIES WITH SERIOUS COMORBIDITY AND BODY MASS INDEX (BMI) OF 40.0 TO 44.9 IN ADULT (H): ICD-10-CM

## 2023-06-13 DIAGNOSIS — E11.29 TYPE 2 DIABETES MELLITUS WITH MICROALBUMINURIA, WITHOUT LONG-TERM CURRENT USE OF INSULIN (H): Primary | ICD-10-CM

## 2023-06-13 LAB
ALBUMIN UR-MCNC: NORMAL G/DL
APPEARANCE UR: NORMAL
BILIRUB UR QL STRIP: NORMAL
COLOR UR AUTO: NORMAL
CREAT UR-MCNC: 37.6 MG/DL
GLUCOSE UR STRIP-MCNC: NORMAL MG/DL
HGB UR QL STRIP: NORMAL
KETONES UR STRIP-MCNC: NORMAL MG/DL
LEUKOCYTE ESTERASE UR QL STRIP: NORMAL
MICROALBUMIN UR-MCNC: <12 MG/L
MICROALBUMIN/CREAT UR: NORMAL MG/G{CREAT}
NITRATE UR QL: NORMAL
PH UR STRIP: NORMAL [PH]
SP GR UR STRIP: NORMAL
UROBILINOGEN UR STRIP-ACNC: NORMAL

## 2023-06-13 PROCEDURE — 81003 URINALYSIS AUTO W/O SCOPE: CPT | Performed by: STUDENT IN AN ORGANIZED HEALTH CARE EDUCATION/TRAINING PROGRAM

## 2023-06-13 PROCEDURE — 82570 ASSAY OF URINE CREATININE: CPT | Performed by: STUDENT IN AN ORGANIZED HEALTH CARE EDUCATION/TRAINING PROGRAM

## 2023-06-13 PROCEDURE — 99214 OFFICE O/P EST MOD 30 MIN: CPT | Performed by: STUDENT IN AN ORGANIZED HEALTH CARE EDUCATION/TRAINING PROGRAM

## 2023-06-13 PROCEDURE — 82043 UR ALBUMIN QUANTITATIVE: CPT | Performed by: STUDENT IN AN ORGANIZED HEALTH CARE EDUCATION/TRAINING PROGRAM

## 2023-06-13 RX ORDER — TIRZEPATIDE 2.5 MG/.5ML
2.5 INJECTION, SOLUTION SUBCUTANEOUS
Qty: 2 ML | Refills: 0 | Status: SHIPPED | OUTPATIENT
Start: 2023-06-13 | End: 2023-07-11

## 2023-06-13 RX ORDER — TIRZEPATIDE 7.5 MG/.5ML
7.5 INJECTION, SOLUTION SUBCUTANEOUS
Qty: 6 ML | Refills: 3 | Status: SHIPPED | OUTPATIENT
Start: 2023-08-08 | End: 2023-08-15

## 2023-06-13 RX ORDER — HYDROCODONE BITARTRATE AND ACETAMINOPHEN 5; 325 MG/1; MG/1
1 TABLET ORAL EVERY 6 HOURS PRN
Qty: 25 TABLET | Refills: 0 | Status: SHIPPED | OUTPATIENT
Start: 2023-06-13 | End: 2023-09-10

## 2023-06-13 RX ORDER — TIRZEPATIDE 5 MG/.5ML
5 INJECTION, SOLUTION SUBCUTANEOUS
Qty: 2 ML | Refills: 0 | Status: SHIPPED | OUTPATIENT
Start: 2023-07-11 | End: 2023-08-08

## 2023-06-13 RX ORDER — BUPROPION HYDROCHLORIDE 150 MG/1
TABLET, EXTENDED RELEASE ORAL
Qty: 270 TABLET | Refills: 1 | Status: SHIPPED | OUTPATIENT
Start: 2023-06-13 | End: 2023-12-06

## 2023-06-13 RX ORDER — CYCLOBENZAPRINE HCL 10 MG
10 TABLET ORAL 3 TIMES DAILY PRN
Qty: 270 TABLET | Refills: 0 | Status: SHIPPED | OUTPATIENT
Start: 2023-06-13 | End: 2023-12-07

## 2023-06-13 ASSESSMENT — PAIN SCALES - GENERAL: PAINLEVEL: MODERATE PAIN (4)

## 2023-06-13 ASSESSMENT — PATIENT HEALTH QUESTIONNAIRE - PHQ9: SUM OF ALL RESPONSES TO PHQ QUESTIONS 1-9: 0

## 2023-06-13 ASSESSMENT — ANXIETY QUESTIONNAIRES: GAD7 TOTAL SCORE: 0

## 2023-06-13 NOTE — PATIENT INSTRUCTIONS
CT parathyroid ordered, but it should be CT to look at parotid gland.     CT Midface 2D Reconstruction; CT Face/Mandible WO IV Cont Trauma should be the correct imaging to run    [phone]       [facility] Diabetes:  [email or web address]

## 2023-06-13 NOTE — PROGRESS NOTES
"  Assessment & Plan     Very pleasant 57 year old who presents for follow up on weight and DM today. Recently seen through [facility] for parotitis.     Type 2 diabetes mellitus with microalbuminuria, without long-term current use of insulin (H)  Class 3 severe obesity due to excess calories with serious comorbidity and body mass index (BMI) of 40.0 to 44.9 in adult (H)  Patient is currently on metformin. However, due to elevated weight, we did discuss multiple GLP-1 options for DM control that may also have better benefit for weight. Today, we opted to try to get her started on Mounjaro. We may be able to get her off of metformin pending how well she tolerates Mounjaro. Will follow up in about 1.5 months  - tirzepatide (MOUNJARO) 2.5 MG/0.5ML pen  Dispense: 2 mL; Refill: 0  - tirzepatide (MOUNJARO) 5 MG/0.5ML pen  Dispense: 2 mL; Refill: 0  - tirzepatide (MOUNJARO) 7.5 MG/0.5ML pen  Dispense: 6 mL; Refill: 3  - PRIMARY CARE FOLLOW-UP SCHEDULING    Microalbuminuria  Recheck today.   - Albumin Random Urine Quantitative with Creat Ratio  - Albumin Random Urine Quantitative with Creat Ratio    Chronic pain syndrome  refill  - HYDROcodone-acetaminophen (NORCO) 5-325 MG tablet  Dispense: 25 tablet; Refill: 0  - cyclobenzaprine (FLEXERIL) 10 MG tablet  Dispense: 270 tablet; Refill: 0    Chronic left-sided low back pain with left-sided sciatica  refill  - HYDROcodone-acetaminophen (NORCO) 5-325 MG tablet  Dispense: 25 tablet; Refill: 0    Chronic bilateral low back pain with bilateral sciatica  refill  - cyclobenzaprine (FLEXERIL) 10 MG tablet  Dispense: 270 tablet; Refill: 0         I spent a total of 35 minutes on the day of the visit.   Time spent by me doing chart review, history and exam, documentation and further activities per the note     BMI:   Estimated body mass index is 43.33 kg/m  as calculated from the following:    Height as of this encounter: 1.74 m (5' 8.5\").    Weight as of this encounter: 131.2 kg " "(289 lb 3.2 oz).   See above    Rebecca Carcamo MD  [facility]    Mitch Bliss is a 57 year old, presenting for the following health issues:  Diabetes    History of Present Illness       Diabetes:   She presents for follow up of diabetes.  She is not checking blood glucose. She is concerned about other. She is not experiencing numbness or burning in feet, excessive thirst, blurry vision, weight changes or redness, sores or blisters on feet.         She eats 0-1 servings of fruits and vegetables daily.She consumes 0 sweetened beverage(s) daily.She exercises with enough effort to increase her heart rate 9 or less minutes per day.  She exercises with enough effort to increase her heart rate 3 or less days per week.   She is taking medications regularly.    Today's PHQ-9         PHQ-9 Total Score: 0    PHQ-9 Q9 Thoughts of better off dead/self-harm past 2 weeks :   Not at all      Today's ANDREW-7 Score: 0     Parotitis currently. Planned imaging soon. Was seen in  this weekend    Interested in medication management for weight loss.     The 10-year ASCVD risk score (Michael SCHAFER, et al., 2019) is: 4.9%    Values used to calculate the score:      Age: 57 years      Sex: Female      Is Non- : No      Diabetic: Yes      Tobacco smoker: No      Systolic Blood Pressure: 127 mmHg      Is BP treated: No      HDL Cholesterol: 57 mg/dL      Total Cholesterol: 220 mg/dL      Review of Systems   Constitutional, HEENT, cardiovascular, pulmonary, GI, , musculoskeletal, neuro, skin, endocrine and psych systems are negative, except as otherwise noted.  Positive for facial swelling.       Objective    /82 (BP Location: Right arm, Patient Position: Sitting, Cuff Size: Adult Regular)   Pulse 98   Temp 99  F (37.2  C) (Oral)   Resp 16   Ht 1.74 m (5' 8.5\")   Wt 131.2 kg (289 lb 3.2 oz)   LMP  (LMP Unknown)   SpO2 98%   BMI 43.33 kg/m    Body mass index is 43.33 " kg/m .  Physical Exam  Constitutional:       Appearance: Normal appearance.   HENT:      Head:      Comments: Left sided preauricular facial swelling.   Eyes:      General: No scleral icterus.     Extraocular Movements: Extraocular movements intact.      Conjunctiva/sclera: Conjunctivae normal.   Cardiovascular:      Rate and Rhythm: Normal rate.   Pulmonary:      Effort: Pulmonary effort is normal.   Neurological:      General: No focal deficit present.      Mental Status: She is alert and oriented to person, place, and time.         Results from this visit  Results for orders placed or performed in visit on 06/13/23   UA Macroscopic with reflex to Microscopic and Culture     Status: None    Specimen: Urine, Clean Catch   Result Value Ref Range    Color Urine      Appearance Urine      Glucose Urine      Bilirubin Urine      Ketones Urine      Specific Gravity Urine      Blood Urine      pH Urine      Protein Albumin Urine      Urobilinogen Urine      Nitrite Urine      Leukocyte Esterase Urine      Narrative    Microscopic not indicated       Results from last visit:  Office Visit on 03/28/2023   Component Date Value Ref Range Status     Vitamin B12 03/28/2023 522  232 - 1,245 pg/mL Final     Hemoglobin A1C 03/28/2023 6.2 (H)  0.0 - 5.6 % Final    Normal <5.7%   Prediabetes 5.7-6.4%    Diabetes 6.5% or higher     Note: Adopted from ADA consensus guidelines.     Cholesterol 03/28/2023 220 (H)  <200 mg/dL Final     Triglycerides 03/28/2023 165 (H)  <150 mg/dL Final     Direct Measure HDL 03/28/2023 57  >=50 mg/dL Final     LDL Cholesterol Calculated 03/28/2023 130 (H)  <=100 mg/dL Final     Non HDL Cholesterol 03/28/2023 163 (H)  <130 mg/dL Final     Sodium 03/28/2023 140  136 - 145 mmol/L Final     Potassium 03/28/2023 4.3  3.4 - 5.3 mmol/L Final     Chloride 03/28/2023 104  98 - 107 mmol/L Final     Carbon Dioxide (CO2) 03/28/2023 26  22 - 29 mmol/L Final     Anion Gap 03/28/2023 10  7 - 15 mmol/L Final     Urea  Nitrogen 03/28/2023 18.0  6.0 - 20.0 mg/dL Final     Creatinine 03/28/2023 1.17 (H)  0.51 - 0.95 mg/dL Final     Calcium 03/28/2023 9.4  8.6 - 10.0 mg/dL Final     Glucose 03/28/2023 136 (H)  70 - 99 mg/dL Final     GFR Estimate 03/28/2023 54 (L)  >60 mL/min/1.73m2 Final    eGFR calculated using 2021 CKD-EPI equation.

## 2023-06-13 NOTE — TELEPHONE ENCOUNTER
Prior Authorization Retail Medication Request    Medication/Dose: Mounjaro 2.5MG/0.5ML   ICD code (if different than what is on RX):  E66.01, Z68.41, E11.29 R80.9  Previously Tried and Failed:    Rationale:      Insurance Name:    Insurance ID:        Pharmacy Information (if different than what is on RX)  Name:    Phone:      Cover my meds:  Key:[id number]

## 2023-06-15 ENCOUNTER — MYC MEDICAL ADVICE (OUTPATIENT)
Dept: FAMILY MEDICINE | Facility: CLINIC | Age: 58
End: 2023-06-15
Payer: COMMERCIAL

## 2023-06-15 DIAGNOSIS — E66.01 CLASS 3 SEVERE OBESITY DUE TO EXCESS CALORIES WITH SERIOUS COMORBIDITY AND BODY MASS INDEX (BMI) OF 40.0 TO 44.9 IN ADULT (H): ICD-10-CM

## 2023-06-15 DIAGNOSIS — E11.29 TYPE 2 DIABETES MELLITUS WITH MICROALBUMINURIA, WITHOUT LONG-TERM CURRENT USE OF INSULIN (H): Primary | ICD-10-CM

## 2023-06-15 DIAGNOSIS — R80.9 TYPE 2 DIABETES MELLITUS WITH MICROALBUMINURIA, WITHOUT LONG-TERM CURRENT USE OF INSULIN (H): Primary | ICD-10-CM

## 2023-06-15 DIAGNOSIS — E66.813 CLASS 3 SEVERE OBESITY DUE TO EXCESS CALORIES WITH SERIOUS COMORBIDITY AND BODY MASS INDEX (BMI) OF 40.0 TO 44.9 IN ADULT (H): ICD-10-CM

## 2023-06-17 NOTE — TELEPHONE ENCOUNTER
Central Prior Authorization Team   Phone: [phone]    PA Initiation    Medication: MOUNJARO  Insurance Company:  [employer]  Pharmacy Filling the Rx: [facility] - [place] - [id number] [facility]  Filling Pharmacy Phone: [phone]  Filling Pharmacy Fax: [phone]  Start Date: 6/17/2023

## 2023-06-19 RX ORDER — SEMAGLUTIDE 1.34 MG/ML
1 INJECTION, SOLUTION SUBCUTANEOUS WEEKLY
Qty: 9 ML | Refills: 3 | Status: SHIPPED | OUTPATIENT
Start: 2023-08-18 | End: 2023-11-16

## 2023-06-19 NOTE — TELEPHONE ENCOUNTER
PRIOR AUTHORIZATION DENIED    Medication: MOUNJARO - DENIED    Denial Date: 6/17/2023    Denial Rational: INSURANCE STATES PATIENT MUST TRY/FAIL FORMULARY ALTERNATIVES - BYETTA, BYDUREON, OZEMPIC, RYBELSUS, TRULICITY, VICTOZA.          Appeal Information:  IF PATIENT IS UNABLE TO TRY/FAIL ALTERNATIVE(S) PLEASE SUPPLY PA TEAM WITH A LETTER OF MEDICAL NECESSITY WITH CLINICAL REASON.

## 2023-07-15 ENCOUNTER — HEALTH MAINTENANCE LETTER (OUTPATIENT)
Age: 58
End: 2023-07-15

## 2023-08-10 ASSESSMENT — ANXIETY QUESTIONNAIRES
7. FEELING AFRAID AS IF SOMETHING AWFUL MIGHT HAPPEN: NOT AT ALL
5. BEING SO RESTLESS THAT IT IS HARD TO SIT STILL: NOT AT ALL
1. FEELING NERVOUS, ANXIOUS, OR ON EDGE: NOT AT ALL
GAD7 TOTAL SCORE: 0
GAD7 TOTAL SCORE: 0
3. WORRYING TOO MUCH ABOUT DIFFERENT THINGS: NOT AT ALL
6. BECOMING EASILY ANNOYED OR IRRITABLE: NOT AT ALL
4. TROUBLE RELAXING: NOT AT ALL
2. NOT BEING ABLE TO STOP OR CONTROL WORRYING: NOT AT ALL
IF YOU CHECKED OFF ANY PROBLEMS ON THIS QUESTIONNAIRE, HOW DIFFICULT HAVE THESE PROBLEMS MADE IT FOR YOU TO DO YOUR WORK, TAKE CARE OF THINGS AT HOME, OR GET ALONG WITH OTHER PEOPLE: NOT DIFFICULT AT ALL

## 2023-08-10 ASSESSMENT — PATIENT HEALTH QUESTIONNAIRE - PHQ9
10. IF YOU CHECKED OFF ANY PROBLEMS, HOW DIFFICULT HAVE THESE PROBLEMS MADE IT FOR YOU TO DO YOUR WORK, TAKE CARE OF THINGS AT HOME, OR GET ALONG WITH OTHER PEOPLE: NOT DIFFICULT AT ALL
SUM OF ALL RESPONSES TO PHQ QUESTIONS 1-9: 0
SUM OF ALL RESPONSES TO PHQ QUESTIONS 1-9: 0

## 2023-08-15 ENCOUNTER — OFFICE VISIT (OUTPATIENT)
Dept: FAMILY MEDICINE | Facility: CLINIC | Age: 58
End: 2023-08-15
Attending: STUDENT IN AN ORGANIZED HEALTH CARE EDUCATION/TRAINING PROGRAM
Payer: COMMERCIAL

## 2023-08-15 VITALS
HEIGHT: 69 IN | SYSTOLIC BLOOD PRESSURE: 117 MMHG | TEMPERATURE: 97.2 F | DIASTOLIC BLOOD PRESSURE: 79 MMHG | OXYGEN SATURATION: 98 % | HEART RATE: 88 BPM | WEIGHT: 291.4 LBS | RESPIRATION RATE: 22 BRPM | BODY MASS INDEX: 43.16 KG/M2

## 2023-08-15 DIAGNOSIS — E66.01 CLASS 3 SEVERE OBESITY DUE TO EXCESS CALORIES WITH SERIOUS COMORBIDITY AND BODY MASS INDEX (BMI) OF 40.0 TO 44.9 IN ADULT (H): Primary | ICD-10-CM

## 2023-08-15 DIAGNOSIS — R80.9 TYPE 2 DIABETES MELLITUS WITH MICROALBUMINURIA, WITHOUT LONG-TERM CURRENT USE OF INSULIN (H): ICD-10-CM

## 2023-08-15 DIAGNOSIS — E11.29 TYPE 2 DIABETES MELLITUS WITH MICROALBUMINURIA, WITHOUT LONG-TERM CURRENT USE OF INSULIN (H): ICD-10-CM

## 2023-08-15 DIAGNOSIS — E66.813 CLASS 3 SEVERE OBESITY DUE TO EXCESS CALORIES WITH SERIOUS COMORBIDITY AND BODY MASS INDEX (BMI) OF 40.0 TO 44.9 IN ADULT (H): Primary | ICD-10-CM

## 2023-08-15 PROCEDURE — 99214 OFFICE O/P EST MOD 30 MIN: CPT | Performed by: STUDENT IN AN ORGANIZED HEALTH CARE EDUCATION/TRAINING PROGRAM

## 2023-08-15 ASSESSMENT — PAIN SCALES - GENERAL: PAINLEVEL: MILD PAIN (3)

## 2023-08-15 NOTE — PROGRESS NOTES
"  Assessment & Plan     Class 3 severe obesity due to excess calories with serious comorbidity and body mass index (BMI) of 40.0 to 44.9 in adult (H)  Type 2 diabetes mellitus with microalbuminuria, without long-term current use of insulin (H)  Patient leeanne 57-year-old female presents today to follow-up on Ozempic.  Patient had tried to get in general, but this was not covered through insurance.  We then switched her to Ozempic.  She is currently on the 0.5 mg weekly dose.  Tolerating well.  She is noticing that it is decreasing her appetite, she is eating fewer meals.  From her reported heaviest weight, she is down 11.1%.  Did have some weight loss though initially related to cutting out sodas.  Compared to last visit, has actually gained a little weight versus potentially decreased a couple pounds based on her scale at work.  For now, continue to monitor weight intermittently, follow-up with labs in October.  If tolerating well, continue and recheck early next year in clinic, sooner if A1c is elevated or too low.    We also did discuss starting a statin today.  She would like to recheck the lipids as her weight changes, but will consider starting a statin if need be.  - PRIMARY CARE FOLLOW-UP SCHEDULING  - Hemoglobin A1c  - Lipid panel reflex to direct LDL Fasting  - PRIMARY CARE FOLLOW-UP SCHEDULING  - Albumin Random Urine Quantitative with Creat Ratio      I spent a total of 20 minutes on the day of the visit.   Time spent by me doing chart review, history and exam, documentation and further activities per the note       BMI:   Estimated body mass index is 43.35 kg/m  as calculated from the following:    Height as of this encounter: 1.746 m (5' 8.75\").    Weight as of this encounter: 132.2 kg (291 lb 6.4 oz).     Rebecca Carcamo MD  [facility]    Mitch Bliss is a 57 year old, presenting for the following health issues:  Diabetes        8/15/2023     1:18 PM   Additional " "Questions   Roomed by Anabella ZHAO CMA       History of Present Illness       Diabetes:   She presents for follow up of diabetes.    She is not checking blood glucose.         She has no concerns regarding her diabetes at this time.   She is not experiencing numbness or burning in feet, excessive thirst, blurry vision, weight changes or redness, sores or blisters on feet.           She eats 0-1 servings of fruits and vegetables daily.She consumes 0 sweetened beverage(s) daily.She exercises with enough effort to increase her heart rate 9 or less minutes per day.  She exercises with enough effort to increase her heart rate 3 or less days per week.   She is taking medications regularly.     Noticed since on higher dose ozempic, eating one main meal a day.   Plan to stop getting candy and ice cream at home.     First 4 weeks not noticing much.   7th injection on Friday before surgery    Stopped BP meds    Highest 323  Here noted 316    287 recently at work.   291 here.   Lost 11%    Since starting ozempic             Review of Systems   Constitutional, HEENT, cardiovascular, pulmonary, GI, , musculoskeletal, neuro, skin, endocrine and psych systems are negative, except as otherwise noted.      Objective    /79 (BP Location: Left arm, Patient Position: Sitting, Cuff Size: Adult Regular)   Pulse 88   Temp 97.2  F (36.2  C) (Tympanic)   Resp 22   Ht 1.746 m (5' 8.75\")   Wt 132.2 kg (291 lb 6.4 oz)   LMP  (LMP Unknown)   SpO2 98%   BMI 43.35 kg/m    Body mass index is 43.35 kg/m .  Physical Exam  Constitutional:       Appearance: Normal appearance.   HENT:      Head: Normocephalic.   Eyes:      General: No scleral icterus.     Extraocular Movements: Extraocular movements intact.      Conjunctiva/sclera: Conjunctivae normal.   Cardiovascular:      Rate and Rhythm: Normal rate.   Pulmonary:      Effort: Pulmonary effort is normal.   Neurological:      General: No focal deficit present.      Mental Status: She is " alert and oriented to person, place, and time.        Results from last visit:  Office Visit on 06/13/2023   Component Date Value Ref Range Status    Color Urine 06/13/2023    Corrected    Comment: Test should not have been ordered.                             This is a corrected result. Previous result was Yellow on 6/13/2023 at  1:14 PM CDT    Appearance Urine 06/13/2023    Corrected    Comment: Test should not have been ordered.                             This is a corrected result. Previous result was Clear on 6/13/2023 at  1:14 PM CDT    Glucose Urine 06/13/2023    Corrected    Comment: Test should not have been ordered.                             This is a corrected result. Previous result was Negative mg/dL on 6/13/2023 at  1:14 PM CDT    Bilirubin Urine 06/13/2023    Corrected    Comment: Test should not have been ordered.                             This is a corrected result. Previous result was Negative on 6/13/2023 at  1:14 PM CDT    Ketones Urine 06/13/2023    Corrected    Comment: Test should not have been ordered.                             This is a corrected result. Previous result was Negative mg/dL on 6/13/2023 at  1:14 PM CDT    Specific Gravity Urine 06/13/2023    Corrected    Comment: Test should not have been ordered.                             This is a corrected result. Previous result was 1.010 on 6/13/2023 at  1:14 PM CDT    Blood Urine 06/13/2023    Corrected    Comment: Test should not have been ordered.                             This is a corrected result. Previous result was Negative on 6/13/2023 at  1:14 PM CDT    pH Urine 06/13/2023    Corrected    Comment: Test should not have been ordered.                             This is a corrected result. Previous result was 6.5 on 6/13/2023 at  1:14 PM CDT    Protein Albumin Urine 06/13/2023    Corrected    Comment: Test should not have been ordered.                             This is a corrected result. Previous result was Negative  mg/dL on 6/13/2023 at  1:14 PM CDT    Urobilinogen Urine 06/13/2023    Corrected    Comment: Test should not have been ordered.                             This is a corrected result. Previous result was 0.2 E.U./dL on 6/13/2023 at  1:14 PM CDT    Nitrite Urine 06/13/2023    Corrected    Comment: Test should not have been ordered.                             This is a corrected result. Previous result was Negative on 6/13/2023 at  1:14 PM CDT    Leukocyte Esterase Urine 06/13/2023    Corrected    Comment: Test should not have been ordered.                             This is a corrected result. Previous result was Negative on 6/13/2023 at  1:14 PM CDT    Creatinine Urine mg/dL 06/13/2023 37.6  mg/dL Final    The reference ranges have not been established in urine creatinine. The results should be integrated into the clinical context for interpretation.    Albumin Urine mg/L 06/13/2023 <12.0  mg/L Final    The reference ranges have not been established in urine albumin. The results should be integrated into the clinical context for interpretation.    Albumin Urine mg/g Cr 06/13/2023    Final    Unable to calculate, urine albumin and/or urine creatinine is outside detectable limits.  Microalbuminuria is defined as an albumin:creatinine ratio of 17 to 299 for males and 25 to 299 for females. A ratio of albumin:creatinine of 300 or higher is indicative of overt proteinuria.  Due to biologic variability, positive results should be confirmed by a second, first-morning random or 24-hour timed urine specimen. If there is discrepancy, a third specimen is recommended. When 2 out of 3 results are in the microalbuminuria range, this is evidence for incipient nephropathy and warrants increased efforts at glucose control, blood pressure control, and institution of therapy with an angiotensin-converting-enzyme (ACE) inhibitor (if the patient can tolerate it).

## 2023-09-10 ENCOUNTER — MYC REFILL (OUTPATIENT)
Dept: FAMILY MEDICINE | Facility: CLINIC | Age: 58
End: 2023-09-10
Payer: COMMERCIAL

## 2023-09-10 DIAGNOSIS — G89.4 CHRONIC PAIN SYNDROME: ICD-10-CM

## 2023-09-10 DIAGNOSIS — M54.42 CHRONIC LEFT-SIDED LOW BACK PAIN WITH LEFT-SIDED SCIATICA: ICD-10-CM

## 2023-09-10 DIAGNOSIS — G89.29 CHRONIC LEFT-SIDED LOW BACK PAIN WITH LEFT-SIDED SCIATICA: ICD-10-CM

## 2023-09-11 RX ORDER — HYDROCODONE BITARTRATE AND ACETAMINOPHEN 5; 325 MG/1; MG/1
1 TABLET ORAL EVERY 6 HOURS PRN
Qty: 25 TABLET | Refills: 0 | Status: SHIPPED | OUTPATIENT
Start: 2023-09-11 | End: 2023-11-09

## 2023-09-11 NOTE — TELEPHONE ENCOUNTER
Patient with chronic pain. Hydrocodone-acet 5-325 mg fills for 25 tablets roughly every 2-3 months. Last seen a few weeks ago. Will send refill for 25 tablets.     Rebecca Carcamo MD

## 2023-09-11 NOTE — TELEPHONE ENCOUNTER
Routing to ordering provider for consideration, not on refill protocol.           Rosetta Carlos     RN MSN

## 2023-10-09 ENCOUNTER — ALLIED HEALTH/NURSE VISIT (OUTPATIENT)
Dept: FAMILY MEDICINE | Facility: CLINIC | Age: 58
End: 2023-10-09
Payer: COMMERCIAL

## 2023-10-09 ENCOUNTER — LAB (OUTPATIENT)
Dept: LAB | Facility: CLINIC | Age: 58
End: 2023-10-09
Payer: COMMERCIAL

## 2023-10-09 DIAGNOSIS — R80.9 TYPE 2 DIABETES MELLITUS WITH MICROALBUMINURIA, WITHOUT LONG-TERM CURRENT USE OF INSULIN (H): ICD-10-CM

## 2023-10-09 DIAGNOSIS — E11.29 TYPE 2 DIABETES MELLITUS WITH MICROALBUMINURIA, WITHOUT LONG-TERM CURRENT USE OF INSULIN (H): ICD-10-CM

## 2023-10-09 DIAGNOSIS — Z23 ENCOUNTER FOR IMMUNIZATION: Primary | ICD-10-CM

## 2023-10-09 LAB — HBA1C MFR BLD: 5.4 % (ref 0–5.6)

## 2023-10-09 PROCEDURE — 36415 COLL VENOUS BLD VENIPUNCTURE: CPT

## 2023-10-09 PROCEDURE — 82570 ASSAY OF URINE CREATININE: CPT

## 2023-10-09 PROCEDURE — 82043 UR ALBUMIN QUANTITATIVE: CPT

## 2023-10-09 PROCEDURE — 80061 LIPID PANEL: CPT

## 2023-10-09 PROCEDURE — 90715 TDAP VACCINE 7 YRS/> IM: CPT

## 2023-10-09 PROCEDURE — 99207 PR NO CHARGE NURSE ONLY: CPT

## 2023-10-09 PROCEDURE — 90682 RIV4 VACC RECOMBINANT DNA IM: CPT

## 2023-10-09 PROCEDURE — 83036 HEMOGLOBIN GLYCOSYLATED A1C: CPT

## 2023-10-09 PROCEDURE — 90471 IMMUNIZATION ADMIN: CPT

## 2023-10-09 PROCEDURE — 90472 IMMUNIZATION ADMIN EACH ADD: CPT

## 2023-10-09 NOTE — PROGRESS NOTES
Prior to immunization administration, verified patients identity using patient s name and date of birth. Please see Immunization Activity for additional information.     Screening Questionnaire for Adult Immunization    Are you sick today?   No   Do you have allergies to medications, food, a vaccine component or latex?   No   Have you ever had a serious reaction after receiving a vaccination?   No   Do you have a long-term health problem with heart, lung, kidney, or metabolic disease (e.g., diabetes), asthma, a blood disorder, no spleen, complement component deficiency, a cochlear implant, or a spinal fluid leak?  Are you on long-term aspirin therapy?   Yes   Do you have cancer, leukemia, HIV/AIDS, or any other immune system problem?   No   Do you have a parent, brother, or sister with an immune system problem?   No   In the past 3 months, have you taken medications that affect  your immune system, such as prednisone, other steroids, or anticancer drugs; drugs for the treatment of rheumatoid arthritis, Crohn s disease, or psoriasis; or have you had radiation treatments?   No   Have you had a seizure, or a brain or other nervous system problem?   No   During the past year, have you received a transfusion of blood or blood    products, or been given immune (gamma) globulin or antiviral drug?   No   For women: Are you pregnant or is there a chance you could become       pregnant during the next month?   No   Have you received any vaccinations in the past 4 weeks?   No       I have reviewed the following standing orders:   This patient is due and qualifies for the Influenza vaccine.    Click here for Influenza Vaccine Standing Order    I have reviewed the vaccines inclusion and exclusion criteria; No concerns regarding eligibility.         This patient is due and qualifies for a TDAP vaccine.    Click here for Tdap Standing Order    I have reviewed the vaccines inclusion and exclusion criteria; No concerns regarding  eligibility.     Patient instructed to remain in clinic for 15 minutes afterwards, and to report any adverse reactions.     Screening performed by Agnieszka Henry MA on 10/9/2023 at 9:19 AM.

## 2023-10-10 ENCOUNTER — MYC MEDICAL ADVICE (OUTPATIENT)
Dept: FAMILY MEDICINE | Facility: CLINIC | Age: 58
End: 2023-10-10
Payer: COMMERCIAL

## 2023-10-10 DIAGNOSIS — R80.9 TYPE 2 DIABETES MELLITUS WITH MICROALBUMINURIA, WITHOUT LONG-TERM CURRENT USE OF INSULIN (H): Primary | ICD-10-CM

## 2023-10-10 DIAGNOSIS — E78.5 HYPERLIPIDEMIA LDL GOAL <100: ICD-10-CM

## 2023-10-10 DIAGNOSIS — E11.29 TYPE 2 DIABETES MELLITUS WITH MICROALBUMINURIA, WITHOUT LONG-TERM CURRENT USE OF INSULIN (H): Primary | ICD-10-CM

## 2023-10-10 LAB
CHOLEST SERPL-MCNC: 259 MG/DL
CREAT UR-MCNC: 110 MG/DL
HDLC SERPL-MCNC: 54 MG/DL
LDLC SERPL CALC-MCNC: 170 MG/DL
MICROALBUMIN UR-MCNC: <12 MG/L
MICROALBUMIN/CREAT UR: NORMAL MG/G{CREAT}
NONHDLC SERPL-MCNC: 205 MG/DL
TRIGL SERPL-MCNC: 175 MG/DL

## 2023-10-10 RX ORDER — METFORMIN HCL 500 MG
500 TABLET, EXTENDED RELEASE 24 HR ORAL DAILY
Qty: 90 TABLET | Refills: 3 | Status: SHIPPED | OUTPATIENT
Start: 2023-10-10 | End: 2024-01-03

## 2023-10-10 RX ORDER — ATORVASTATIN CALCIUM 20 MG/1
20 TABLET, FILM COATED ORAL DAILY
Qty: 90 TABLET | Refills: 3 | Status: SHIPPED | OUTPATIENT
Start: 2023-10-10 | End: 2024-09-10

## 2023-10-14 DIAGNOSIS — F33.0 MAJOR DEPRESSIVE DISORDER, RECURRENT, MILD (H): ICD-10-CM

## 2023-10-17 RX ORDER — GABAPENTIN 100 MG/1
100-200 CAPSULE ORAL AT BEDTIME
Qty: 180 CAPSULE | Refills: 2 | Status: SHIPPED | OUTPATIENT
Start: 2023-10-17 | End: 2024-09-06

## 2023-11-08 ENCOUNTER — MYC MEDICAL ADVICE (OUTPATIENT)
Dept: FAMILY MEDICINE | Facility: CLINIC | Age: 58
End: 2023-11-08
Payer: COMMERCIAL

## 2023-11-08 DIAGNOSIS — G89.29 CHRONIC LEFT-SIDED LOW BACK PAIN WITH LEFT-SIDED SCIATICA: ICD-10-CM

## 2023-11-08 DIAGNOSIS — G89.4 CHRONIC PAIN SYNDROME: ICD-10-CM

## 2023-11-08 DIAGNOSIS — M54.42 CHRONIC LEFT-SIDED LOW BACK PAIN WITH LEFT-SIDED SCIATICA: ICD-10-CM

## 2023-11-09 RX ORDER — HYDROCODONE BITARTRATE AND ACETAMINOPHEN 5; 325 MG/1; MG/1
1 TABLET ORAL EVERY 6 HOURS PRN
Qty: 25 TABLET | Refills: 0 | Status: SHIPPED | OUTPATIENT
Start: 2023-11-09 | End: 2024-02-19

## 2023-11-27 DIAGNOSIS — L70.0 ACNE VULGARIS: Primary | ICD-10-CM

## 2023-11-27 RX ORDER — CLINDAMYCIN PHOSPHATE 11.9 MG/ML
SOLUTION TOPICAL 2 TIMES DAILY PRN
Qty: 60 ML | Refills: 1 | Status: SHIPPED | OUTPATIENT
Start: 2023-11-27 | End: 2024-09-30

## 2023-11-27 NOTE — TELEPHONE ENCOUNTER
"Routing refill request to provider for review/approval because:  Provider to review - medication patient reported       Requested Prescriptions   Pending Prescriptions Disp Refills    clindamycin (CLEOCIN T) 1 % external solution       Sig: Apply topically 2 times daily as needed       Topical Acne Medications Protocol Passed - 11/27/2023  2:48 PM        Passed - Patient is 12 years of age or older        Passed - Recent (12 mo) or future (30 days) visit within the authorizing provider's specialty     Patient has had an office visit with the authorizing provider or a provider within the authorizing providers department within the previous 12 mos or has a future within next 30 days. See \"Patient Info\" tab in inbasket, or \"Choose Columns\" in Meds & Orders section of the refill encounter.              Passed - Medication is active on med list                 James Miller RN 11/27/23 2:49 PM   "

## 2023-12-02 ENCOUNTER — HEALTH MAINTENANCE LETTER (OUTPATIENT)
Age: 58
End: 2023-12-02

## 2023-12-06 DIAGNOSIS — F33.1 MAJOR DEPRESSIVE DISORDER, RECURRENT EPISODE, MODERATE (H): ICD-10-CM

## 2023-12-06 RX ORDER — BUPROPION HYDROCHLORIDE 150 MG/1
TABLET, EXTENDED RELEASE ORAL
Qty: 270 TABLET | Refills: 1 | Status: SHIPPED | OUTPATIENT
Start: 2023-12-06 | End: 2024-06-17

## 2023-12-07 DIAGNOSIS — M54.41 CHRONIC BILATERAL LOW BACK PAIN WITH BILATERAL SCIATICA: ICD-10-CM

## 2023-12-07 DIAGNOSIS — M54.42 CHRONIC BILATERAL LOW BACK PAIN WITH BILATERAL SCIATICA: ICD-10-CM

## 2023-12-07 DIAGNOSIS — G89.29 CHRONIC BILATERAL LOW BACK PAIN WITH BILATERAL SCIATICA: ICD-10-CM

## 2023-12-07 DIAGNOSIS — G89.4 CHRONIC PAIN SYNDROME: ICD-10-CM

## 2023-12-08 ENCOUNTER — OFFICE VISIT (OUTPATIENT)
Dept: FAMILY MEDICINE | Facility: CLINIC | Age: 58
End: 2023-12-08
Payer: COMMERCIAL

## 2023-12-08 VITALS
BODY MASS INDEX: 41.93 KG/M2 | HEART RATE: 90 BPM | DIASTOLIC BLOOD PRESSURE: 80 MMHG | TEMPERATURE: 94.7 F | RESPIRATION RATE: 16 BRPM | OXYGEN SATURATION: 99 % | SYSTOLIC BLOOD PRESSURE: 124 MMHG | WEIGHT: 281.9 LBS

## 2023-12-08 DIAGNOSIS — H02.89 IRRITATION OF EYELID: Primary | ICD-10-CM

## 2023-12-08 PROCEDURE — 99213 OFFICE O/P EST LOW 20 MIN: CPT | Performed by: NURSE PRACTITIONER

## 2023-12-08 RX ORDER — CYCLOBENZAPRINE HCL 10 MG
10 TABLET ORAL 3 TIMES DAILY PRN
Qty: 270 TABLET | Refills: 0 | Status: SHIPPED | OUTPATIENT
Start: 2023-12-08 | End: 2024-05-09

## 2023-12-08 RX ORDER — DESONIDE 0.5 MG/G
CREAM TOPICAL 2 TIMES DAILY
Qty: 15 G | Refills: 0 | Status: SHIPPED | OUTPATIENT
Start: 2023-12-08 | End: 2023-12-29

## 2023-12-08 ASSESSMENT — PAIN SCALES - GENERAL: PAINLEVEL: NO PAIN (0)

## 2023-12-08 NOTE — PROGRESS NOTES
Assessment & Plan     Irritation of eyelid  -scaly, slightly erythematous skin-upper eyelid and the lateral corner of the left eye  -will try low potency steroid cream, advised patient to avoid any eye contact, may also use over the counter CeraVe cream for eczema   - desonide (DESOWEN) 0.05 % external cream; Apply topically 2 times daily      YURIDIA Mcdaniels CNP  [facility] CenterMATHEUS Bliss is a 57 year old, presenting for the following health issues:  Eye Exam        12/8/2023    10:05 AM   Additional Questions   Roomed by mi   Accompanied by self         12/8/2023    10:05 AM   Patient Reported Additional Medications   Patient reports taking the following new medications none       History of Present Illness       Reason for visit:  Left eye, itchy, and skin is cracked in both corners  Symptom onset:  3-7 days ago  Symptoms include:  Skin is extremely itchy, cracked and crusty when I wake up  Symptom intensity:  Moderate  Symptom progression:  Worsening  Had these symptoms before:  No  What makes it worse:  Rubbing it to alleviate the itching, but then it hurts afterwards  What makes it better:  I ve been putting bacitracin on it, it soothes it for a short time  but that doesn t seem to be helping    She eats 0-1 servings of fruits and vegetables daily.She consumes 0 sweetened beverage(s) daily.She exercises with enough effort to increase her heart rate 9 or less minutes per day.  She exercises with enough effort to increase her heart rate 3 or less days per week.   She is taking medications regularly.        Review of Systems   Constitutional, HEENT, cardiovascular, pulmonary, gi and gu systems are negative, except as otherwise noted.      Objective    /80   Pulse 90   Temp (!) 94.7  F (34.8  C) (Tympanic)   Resp 16   Wt 127.9 kg (281 lb 14.4 oz)   LMP  (LMP Unknown)   SpO2 99%   BMI 41.93 kg/m    Body mass index is 41.93 kg/m .  Physical Exam   GENERAL:  healthy, alert and no distress  EYES: Eyes grossly normal to inspection. Left eye: scaly, slightly erythematous skin-upper eyelid and the lateral corner of the left eye  NEURO: Normal strength and tone, mentation intact and speech normal  PSYCH: mentation appears normal, affect normal/bright

## 2023-12-23 ASSESSMENT — ENCOUNTER SYMPTOMS
ABDOMINAL PAIN: 0
FEVER: 0
HEMATOCHEZIA: 0
COUGH: 0
BREAST MASS: 0
HEMATURIA: 0
FREQUENCY: 0
NAUSEA: 0
JOINT SWELLING: 0
PALPITATIONS: 0
SORE THROAT: 0
NERVOUS/ANXIOUS: 0
DIZZINESS: 1
WEAKNESS: 0
HEARTBURN: 0
EYE PAIN: 0
ARTHRALGIAS: 0
DYSURIA: 0
PARESTHESIAS: 0
CHILLS: 0
SHORTNESS OF BREATH: 0
CONSTIPATION: 1
HEADACHES: 1
MYALGIAS: 0
DIARRHEA: 0

## 2023-12-28 ENCOUNTER — TELEPHONE (OUTPATIENT)
Dept: FAMILY MEDICINE | Facility: CLINIC | Age: 58
End: 2023-12-28
Payer: COMMERCIAL

## 2023-12-28 NOTE — TELEPHONE ENCOUNTER
Patient Quality Outreach    Patient is due for the following:   Diabetes -  A1C and Foot Exam    Next Steps:   Patient has upcoming appointment, these items will be addressed at that time.    Type of outreach:    Chart review performed, no outreach needed.      Questions for provider review:    None           Jennifer Restrepo MA

## 2023-12-29 ENCOUNTER — MYC MEDICAL ADVICE (OUTPATIENT)
Dept: FAMILY MEDICINE | Facility: CLINIC | Age: 58
End: 2023-12-29

## 2023-12-29 ENCOUNTER — OFFICE VISIT (OUTPATIENT)
Dept: FAMILY MEDICINE | Facility: CLINIC | Age: 58
End: 2023-12-29
Payer: COMMERCIAL

## 2023-12-29 VITALS
DIASTOLIC BLOOD PRESSURE: 78 MMHG | HEIGHT: 69 IN | RESPIRATION RATE: 16 BRPM | BODY MASS INDEX: 41.47 KG/M2 | SYSTOLIC BLOOD PRESSURE: 122 MMHG | TEMPERATURE: 97.3 F | OXYGEN SATURATION: 97 % | HEART RATE: 94 BPM | WEIGHT: 280 LBS

## 2023-12-29 DIAGNOSIS — E11.9 TYPE 2 DIABETES MELLITUS WITHOUT COMPLICATION, WITHOUT LONG-TERM CURRENT USE OF INSULIN (H): ICD-10-CM

## 2023-12-29 DIAGNOSIS — R42 LIGHTHEADEDNESS: ICD-10-CM

## 2023-12-29 DIAGNOSIS — L94.2 CALCINOSIS CUTIS: Primary | ICD-10-CM

## 2023-12-29 DIAGNOSIS — E78.5 HYPERLIPIDEMIA LDL GOAL <100: ICD-10-CM

## 2023-12-29 DIAGNOSIS — Z00.00 ROUTINE GENERAL MEDICAL EXAMINATION AT A HEALTH CARE FACILITY: Primary | ICD-10-CM

## 2023-12-29 DIAGNOSIS — G89.4 CHRONIC PAIN SYNDROME: ICD-10-CM

## 2023-12-29 LAB
ALBUMIN SERPL BCG-MCNC: 4.4 G/DL (ref 3.5–5.2)
ALP SERPL-CCNC: 66 U/L (ref 40–150)
ALT SERPL W P-5'-P-CCNC: 14 U/L (ref 0–50)
ANION GAP SERPL CALCULATED.3IONS-SCNC: 12 MMOL/L (ref 7–15)
AST SERPL W P-5'-P-CCNC: 15 U/L (ref 0–45)
BILIRUB SERPL-MCNC: 0.4 MG/DL
BUN SERPL-MCNC: 17.6 MG/DL (ref 6–20)
CALCIUM SERPL-MCNC: 10 MG/DL (ref 8.6–10)
CHLORIDE SERPL-SCNC: 102 MMOL/L (ref 98–107)
CHOLEST SERPL-MCNC: 166 MG/DL
CREAT SERPL-MCNC: 1.07 MG/DL (ref 0.51–0.95)
CREAT UR-MCNC: 64.5 MG/DL
CREAT UR-MCNC: 65 MG/DL
DEPRECATED HCO3 PLAS-SCNC: 26 MMOL/L (ref 22–29)
EGFRCR SERPLBLD CKD-EPI 2021: 60 ML/MIN/1.73M2
ERYTHROCYTE [DISTWIDTH] IN BLOOD BY AUTOMATED COUNT: 11.8 % (ref 10–15)
FASTING STATUS PATIENT QL REPORTED: YES
GLUCOSE SERPL-MCNC: 95 MG/DL (ref 70–99)
HBA1C MFR BLD: 5.3 % (ref 0–5.6)
HCT VFR BLD AUTO: 44.9 % (ref 35–47)
HDLC SERPL-MCNC: 59 MG/DL
HGB BLD-MCNC: 15.2 G/DL (ref 11.7–15.7)
HOLD SPECIMEN: NORMAL
LDLC SERPL CALC-MCNC: 84 MG/DL
MAGNESIUM SERPL-MCNC: 2.3 MG/DL (ref 1.7–2.3)
MCH RBC QN AUTO: 31.8 PG (ref 26.5–33)
MCHC RBC AUTO-ENTMCNC: 33.9 G/DL (ref 31.5–36.5)
MCV RBC AUTO: 94 FL (ref 78–100)
MICROALBUMIN UR-MCNC: <12 MG/L
MICROALBUMIN/CREAT UR: NORMAL MG/G{CREAT}
NONHDLC SERPL-MCNC: 107 MG/DL
PHOSPHATE SERPL-MCNC: 4 MG/DL (ref 2.5–4.5)
PLATELET # BLD AUTO: 368 10E3/UL (ref 150–450)
POTASSIUM SERPL-SCNC: 4.7 MMOL/L (ref 3.4–5.3)
PROT SERPL-MCNC: 7.3 G/DL (ref 6.4–8.3)
RBC # BLD AUTO: 4.78 10E6/UL (ref 3.8–5.2)
SODIUM SERPL-SCNC: 140 MMOL/L (ref 135–145)
TRIGL SERPL-MCNC: 116 MG/DL
WBC # BLD AUTO: 7.5 10E3/UL (ref 4–11)

## 2023-12-29 PROCEDURE — 84100 ASSAY OF PHOSPHORUS: CPT | Performed by: STUDENT IN AN ORGANIZED HEALTH CARE EDUCATION/TRAINING PROGRAM

## 2023-12-29 PROCEDURE — 80053 COMPREHEN METABOLIC PANEL: CPT | Performed by: STUDENT IN AN ORGANIZED HEALTH CARE EDUCATION/TRAINING PROGRAM

## 2023-12-29 PROCEDURE — 90471 IMMUNIZATION ADMIN: CPT | Performed by: STUDENT IN AN ORGANIZED HEALTH CARE EDUCATION/TRAINING PROGRAM

## 2023-12-29 PROCEDURE — 90746 HEPB VACCINE 3 DOSE ADULT IM: CPT | Performed by: STUDENT IN AN ORGANIZED HEALTH CARE EDUCATION/TRAINING PROGRAM

## 2023-12-29 PROCEDURE — 85027 COMPLETE CBC AUTOMATED: CPT | Performed by: STUDENT IN AN ORGANIZED HEALTH CARE EDUCATION/TRAINING PROGRAM

## 2023-12-29 PROCEDURE — 36415 COLL VENOUS BLD VENIPUNCTURE: CPT | Performed by: STUDENT IN AN ORGANIZED HEALTH CARE EDUCATION/TRAINING PROGRAM

## 2023-12-29 PROCEDURE — 99207 PR FOOT EXAM NO CHARGE: CPT | Performed by: STUDENT IN AN ORGANIZED HEALTH CARE EDUCATION/TRAINING PROGRAM

## 2023-12-29 PROCEDURE — 83735 ASSAY OF MAGNESIUM: CPT | Performed by: STUDENT IN AN ORGANIZED HEALTH CARE EDUCATION/TRAINING PROGRAM

## 2023-12-29 PROCEDURE — G0481 DRUG TEST DEF 8-14 CLASSES: HCPCS | Performed by: STUDENT IN AN ORGANIZED HEALTH CARE EDUCATION/TRAINING PROGRAM

## 2023-12-29 PROCEDURE — 90472 IMMUNIZATION ADMIN EACH ADD: CPT | Performed by: STUDENT IN AN ORGANIZED HEALTH CARE EDUCATION/TRAINING PROGRAM

## 2023-12-29 PROCEDURE — 99396 PREV VISIT EST AGE 40-64: CPT | Mod: 25 | Performed by: STUDENT IN AN ORGANIZED HEALTH CARE EDUCATION/TRAINING PROGRAM

## 2023-12-29 PROCEDURE — 83036 HEMOGLOBIN GLYCOSYLATED A1C: CPT | Performed by: STUDENT IN AN ORGANIZED HEALTH CARE EDUCATION/TRAINING PROGRAM

## 2023-12-29 PROCEDURE — 82570 ASSAY OF URINE CREATININE: CPT | Performed by: STUDENT IN AN ORGANIZED HEALTH CARE EDUCATION/TRAINING PROGRAM

## 2023-12-29 PROCEDURE — 82043 UR ALBUMIN QUANTITATIVE: CPT | Performed by: STUDENT IN AN ORGANIZED HEALTH CARE EDUCATION/TRAINING PROGRAM

## 2023-12-29 PROCEDURE — 99214 OFFICE O/P EST MOD 30 MIN: CPT | Mod: 25 | Performed by: STUDENT IN AN ORGANIZED HEALTH CARE EDUCATION/TRAINING PROGRAM

## 2023-12-29 PROCEDURE — 80061 LIPID PANEL: CPT | Performed by: STUDENT IN AN ORGANIZED HEALTH CARE EDUCATION/TRAINING PROGRAM

## 2023-12-29 PROCEDURE — 90677 PCV20 VACCINE IM: CPT | Performed by: STUDENT IN AN ORGANIZED HEALTH CARE EDUCATION/TRAINING PROGRAM

## 2023-12-29 RX ORDER — BLOOD SUGAR DIAGNOSTIC
1 STRIP MISCELLANEOUS WEEKLY
Qty: 100 EACH | Refills: 3 | Status: SHIPPED | OUTPATIENT
Start: 2023-12-29

## 2023-12-29 RX ORDER — RIZATRIPTAN BENZOATE 10 MG/1
TABLET ORAL
COMMUNITY
Start: 2023-12-26

## 2023-12-29 RX ORDER — SEMAGLUTIDE 1.34 MG/ML
INJECTION, SOLUTION SUBCUTANEOUS
COMMUNITY
Start: 2023-12-18 | End: 2024-06-24

## 2023-12-29 ASSESSMENT — ENCOUNTER SYMPTOMS
DIARRHEA: 0
MYALGIAS: 0
PALPITATIONS: 0
ABDOMINAL PAIN: 0
EYE PAIN: 0
DYSURIA: 0
DIZZINESS: 1
SORE THROAT: 0
HEMATOCHEZIA: 0
NERVOUS/ANXIOUS: 0
FEVER: 0
BREAST MASS: 0
SHORTNESS OF BREATH: 0
HEADACHES: 1
NAUSEA: 0
CONSTIPATION: 1
CHILLS: 0
JOINT SWELLING: 0
ARTHRALGIAS: 0
HEARTBURN: 0
FREQUENCY: 0
PARESTHESIAS: 0
HEMATURIA: 0
WEAKNESS: 0
COUGH: 0

## 2023-12-29 ASSESSMENT — ANXIETY QUESTIONNAIRES
1. FEELING NERVOUS, ANXIOUS, OR ON EDGE: NOT AT ALL
GAD7 TOTAL SCORE: 0
IF YOU CHECKED OFF ANY PROBLEMS ON THIS QUESTIONNAIRE, HOW DIFFICULT HAVE THESE PROBLEMS MADE IT FOR YOU TO DO YOUR WORK, TAKE CARE OF THINGS AT HOME, OR GET ALONG WITH OTHER PEOPLE: NOT DIFFICULT AT ALL
GAD7 TOTAL SCORE: 0
3. WORRYING TOO MUCH ABOUT DIFFERENT THINGS: NOT AT ALL
4. TROUBLE RELAXING: NOT AT ALL
5. BEING SO RESTLESS THAT IT IS HARD TO SIT STILL: NOT AT ALL
6. BECOMING EASILY ANNOYED OR IRRITABLE: NOT AT ALL
7. FEELING AFRAID AS IF SOMETHING AWFUL MIGHT HAPPEN: NOT AT ALL
2. NOT BEING ABLE TO STOP OR CONTROL WORRYING: NOT AT ALL

## 2023-12-29 ASSESSMENT — PATIENT HEALTH QUESTIONNAIRE - PHQ9
SUM OF ALL RESPONSES TO PHQ QUESTIONS 1-9: 0
10. IF YOU CHECKED OFF ANY PROBLEMS, HOW DIFFICULT HAVE THESE PROBLEMS MADE IT FOR YOU TO DO YOUR WORK, TAKE CARE OF THINGS AT HOME, OR GET ALONG WITH OTHER PEOPLE: NOT DIFFICULT AT ALL
SUM OF ALL RESPONSES TO PHQ QUESTIONS 1-9: 0

## 2023-12-29 ASSESSMENT — PAIN SCALES - GENERAL: PAINLEVEL: MODERATE PAIN (4)

## 2023-12-29 NOTE — PATIENT INSTRUCTIONS
Senna (Senakot) - one at bedtime.   Preventive Health Recommendations  Female Ages 50 - 64    Yearly exam: See your health care provider every year in order to  Review health changes.   Discuss preventive care.    Review your medicines if your doctor has prescribed any.    Get a Pap test every three years (unless you have an abnormal result and your provider advises testing more often).  If you get Pap tests with HPV test, you only need to test every 5 years, unless you have an abnormal result.   You do not need a Pap test if your uterus was removed (hysterectomy) and you have not had cancer.  You should be tested each year for STDs (sexually transmitted diseases) if you're at risk.   Have a mammogram every 1 to 2 years.  Have a colonoscopy at age 45, or have a yearly FIT test (stool test). These exams screen for colon cancer.    Have a cholesterol test every 5 years, or more often if advised.  Have a diabetes test (fasting glucose) every three years. If you are at risk for diabetes, you should have this test more often.   If you are at risk for osteoporosis (brittle bone disease), think about having a bone density scan (DEXA).    Shots: Get a flu shot each year. Get a tetanus shot every 10 years.    Nutrition:   Eat at least 5 servings of fruits and vegetables each day.  Eat whole-grain bread, whole-wheat pasta and brown rice instead of white grains and rice.  Get adequate Calcium and Vitamin D.     Lifestyle  Exercise at least 150 minutes a week (30 minutes a day, 5 days a week). This will help you control your weight and prevent disease.  Limit alcohol to one drink per day.  No smoking.   Wear sunscreen to prevent skin cancer.   See your dentist every six months for an exam and cleaning.  See your eye doctor every 1 to 2 years.

## 2023-12-29 NOTE — LETTER

## 2023-12-29 NOTE — PROGRESS NOTES
SUBJECTIVE:   Ruthy is a 58 year old, presenting for the following:  Annual Visit        12/29/2023     8:38 AM   Additional Questions   Roomed by Bibiana HOPE CMA       Healthy Habits:     Getting at least 3 servings of Calcium per day:  NO    Bi-annual eye exam:  Yes    Dental care twice a year:  Yes    Sleep apnea or symptoms of sleep apnea:  None    Diet:  Carbohydrate counting    Frequency of exercise:  None    Taking medications regularly:  Yes    Medication side effects:  Lightheadedness    Additional concerns today:  No      Today's PHQ-9 Score:       12/29/2023     8:32 AM   PHQ-9 SCORE   PHQ-9 Total Score MyChart 0   PHQ-9 Total Score 0         Diabetes Foot Check    Dizziness    Duration: About a month when showering and when eyes are closed, feels light headed and off balance.  Description   Feeling faint:  no   Feeling like the surroundings are moving: no   Loss of consciousness or falls: no   Intensity:  mild  Accompanying signs and symptoms:   Nausea/vomitting: no   Palpitations: no   Weakness in arms or legs: no   Vision or speech changes: no   Ringing in ears (Tinnitus): no   Hearing loss related to dizziness: no   Other (fevers/chills/sweating/dyspnea): no   History (similar episodes/head trauma/previous evaluation/recent bleeding): None  Precipitating or alleviating factors (new meds/chemicals): None  Worse with activity/head movement: no   Therapies tried and outcome: None      Off blanace when eyes closed, like in the shower. Almost every day for the last month.   Was off and on previously.   When get up and turn quick at work, will get lightheaded and has to stop for a second  No heart palpitations. This is progressively worse too.    Quit the BP med months ago because of this, and was great     BP Readings from Last 2 Encounters:   12/29/23 122/78   12/08/23 124/80     Hemoglobin A1C (%)   Date Value   12/29/2023 5.3   10/09/2023 5.4     LDL Cholesterol Calculated (mg/dL)   Date Value    2023 84   10/09/2023 170 (H)             Social History     Tobacco Use    Smoking status: Former     Packs/day: 0.50     Years: 5.00     Additional pack years: 0.00     Total pack years: 2.50     Types: Cigarettes     Start date: 1982     Quit date: 1990     Years since quittin.0     Passive exposure: Never    Smokeless tobacco: Never   Substance Use Topics    Alcohol use: Yes     Comment: I rarely drink             2023     8:32 PM   Alcohol Use   Prescreen: >3 drinks/day or >7 drinks/week? No     Reviewed orders with patient.  Reviewed health maintenance and updated orders accordingly - Yes    Breast Cancer Screenin/13/2022    11:01 AM 2023     8:34 PM   Breast CA Risk Assessment (FHS-7)   Do you have a family history of breast, colon, or ovarian cancer? Yes No / Unknown       Mammogram Screening: Recommended mammography every 1-2 years with patient discussion and risk factor consideration  Pertinent mammograms are reviewed under the imaging tab.    History of abnormal Pap smear: NO - age 30-65 PAP every 5 years with negative HPV co-testing recommended      2022    12:00 PM   PAP / HPV   PAP-ABSTRACT See Scanned Document           This result is from an external source.     Reviewed and updated as needed this visit by clinical staff   Tobacco  Allergies  Meds              Reviewed and updated as needed this visit by Provider                   Review of Systems   Constitutional:  Negative for chills and fever.   HENT:  Negative for congestion, ear pain, hearing loss and sore throat.    Eyes:  Positive for visual disturbance. Negative for pain.   Respiratory:  Negative for cough and shortness of breath.    Cardiovascular:  Negative for chest pain, palpitations and peripheral edema.   Gastrointestinal:  Positive for constipation. Negative for abdominal pain, diarrhea, heartburn, hematochezia and nausea.   Breasts:  Negative for tenderness, breast mass and  "discharge.   Genitourinary:  Negative for dysuria, frequency, genital sores, hematuria, pelvic pain, urgency, vaginal bleeding and vaginal discharge.   Musculoskeletal:  Negative for arthralgias, joint swelling and myalgias.   Skin:  Negative for rash.   Neurological:  Positive for dizziness and headaches. Negative for weakness and paresthesias.   Psychiatric/Behavioral:  Negative for mood changes. The patient is not nervous/anxious.        OBJECTIVE:   /78 (BP Location: Right arm, Patient Position: Sitting, Cuff Size: Adult Large)   Pulse 94   Temp 97.3  F (36.3  C) (Tympanic)   Resp 16   Ht 1.746 m (5' 8.75\")   Wt 127 kg (280 lb)   LMP  (LMP Unknown)   SpO2 97%   BMI 41.65 kg/m    Physical Exam  GENERAL: healthy, alert and no distress  EYES: Eyes grossly normal to inspection, and conjunctivae and sclerae normal  HENT: ear canals and TM's normal, nose and mouth without ulcers or lesions  NECK: no adenopathy, no asymmetry, masses, or scars  RESP: lungs clear to auscultation - no rales, rhonchi or wheezes  CV: regular rate and rhythm, normal S1 S2, no S3 or S4, no murmur, click or rub, no peripheral edema  ABDOMEN: soft, nontender, no hepatosplenomegaly, no masses and bowel sounds normal  MS: no gross musculoskeletal defects noted, no edema  SKIN: no suspicious lesions or rashes  NEURO: Normal strength and tone, mentation intact and speech normal  PSYCH: mentation appears normal, affect normal/bright    Diabetic Foot Screen:  Any complaints of increased pain or numbness ? No  Is there a foot ulcer now or a history of foot ulcer? No  Does the foot have an abnormal shape? No  Are the nails thick, too long or ingrown? No  Are there any redness or open areas? No         Sensation Testing done at all points on the diagram with monofilament     Right Foot: Sensation Absent at the following points , 1, and 3  Left Foot: Sensation Absent at the following points , 1, and 3     Risk Category: 1- Loss of " protective sensation with normal appearing foot (no weakness, deformity, callous, pre-ulcer or h/o ulceration)  Performed by Rebecca Carcamo MD       Results from this visit  Results for orders placed or performed in visit on 12/29/23   Phosphorus     Status: Normal   Result Value Ref Range    Phosphorus 4.0 2.5 - 4.5 mg/dL   Magnesium     Status: Normal   Result Value Ref Range    Magnesium 2.3 1.7 - 2.3 mg/dL   Comprehensive metabolic panel     Status: Abnormal   Result Value Ref Range    Sodium 140 135 - 145 mmol/L    Potassium 4.7 3.4 - 5.3 mmol/L    Carbon Dioxide (CO2) 26 22 - 29 mmol/L    Anion Gap 12 7 - 15 mmol/L    Urea Nitrogen 17.6 6.0 - 20.0 mg/dL    Creatinine 1.07 (H) 0.51 - 0.95 mg/dL    GFR Estimate 60 (L) >60 mL/min/1.73m2    Calcium 10.0 8.6 - 10.0 mg/dL    Chloride 102 98 - 107 mmol/L    Glucose 95 70 - 99 mg/dL    Alkaline Phosphatase 66 40 - 150 U/L    AST 15 0 - 45 U/L    ALT 14 0 - 50 U/L    Protein Total 7.3 6.4 - 8.3 g/dL    Albumin 4.4 3.5 - 5.2 g/dL    Bilirubin Total 0.4 <=1.2 mg/dL   Albumin Random Urine Quantitative with Creat Ratio     Status: None   Result Value Ref Range    Creatinine Urine mg/dL 64.5 mg/dL    Albumin Urine mg/L <12.0 mg/L    Albumin Urine mg/g Cr     Lipid panel reflex to direct LDL Fasting     Status: None   Result Value Ref Range    Cholesterol 166 <200 mg/dL    Triglycerides 116 <150 mg/dL    Direct Measure HDL 59 >=50 mg/dL    LDL Cholesterol Calculated 84 <=100 mg/dL    Non HDL Cholesterol 107 <130 mg/dL    Patient Fasting > 8hrs? Yes     Narrative    Cholesterol  Desirable:  <200 mg/dL    Triglycerides  Normal:  Less than 150 mg/dL  Borderline High:  150-199 mg/dL  High:  200-499 mg/dL  Very High:  Greater than or equal to 500 mg/dL    Direct Measure HDL  Female:  Greater than or equal to 50 mg/dL   Male:  Greater than or equal to 40 mg/dL    LDL Cholesterol  Desirable:  <100mg/dL  Above Desirable:  100-129 mg/dL   Borderline High:  130-159 mg/dL   High:   160-189 mg/dL   Very High:  >= 190 mg/dL    Non HDL Cholesterol  Desirable:  130 mg/dL  Above Desirable:  130-159 mg/dL  Borderline High:  160-189 mg/dL  High:  190-219 mg/dL  Very High:  Greater than or equal to 220 mg/dL   HEMOGLOBIN A1C     Status: Normal   Result Value Ref Range    Hemoglobin A1C 5.3 0.0 - 5.6 %   CBC with Platelets and Reflex to Iron Studies     Status: Normal   Result Value Ref Range    WBC Count 7.5 4.0 - 11.0 10e3/uL    RBC Count 4.78 3.80 - 5.20 10e6/uL    Hemoglobin 15.2 11.7 - 15.7 g/dL    Hematocrit 44.9 35.0 - 47.0 %    MCV 94 78 - 100 fL    MCH 31.8 26.5 - 33.0 pg    MCHC 33.9 31.5 - 36.5 g/dL    RDW 11.8 10.0 - 15.0 %    Platelet Count 368 150 - 450 10e3/uL   Extra Green Top (Lithium Heparin) Tube     Status: None   Result Value Ref Range    Hold Specimen [facility]    Urine Creatinine for Drug Screen Panel     Status: None   Result Value Ref Range    Creatinine Urine for Drug Screen 65 mg/dL   CBC with Platelets and Reflex to Iron Studies     Status: None    Narrative    The following orders were created for panel order CBC with Platelets and Reflex to Iron Studies.  Procedure                               Abnormality         Status                     ---------                               -----------         ------                     CBC with Platelets and R...[[id number]]  Normal              Final result               Extra Green Top (Lithium...[[id number]]                      Final result                 Please view results for these tests on the individual orders.   [id number] - Urine Drug Confirmation Panel (Comprehensive)     Status: None (In process)    Narrative    The following orders were created for panel order [id number] - Urine Drug Confirmation Panel (Comprehensive).  Procedure                               Abnormality         Status                     ---------                               -----------         ------                     Urine Drug Confirmation ...[[id number]]                       In process                 Urine Creatinine for Jeet...[[id number]]                      Final result                 Please view results for these tests on the individual orders.         ASSESSMENT/PLAN:   Ruthy was seen today for annual visit.    Diagnoses and all orders for this visit:    Routine general medical examination at a health care facility  Patient is a very pleasant 58-year-old female who presents today for annual visit, as well as follow-up on diabetes, new lightheadedness.  From an annual visit perspective, patient is due for annual diabetes labs.  Vaccinations updated today.    Type 2 diabetes mellitus without complication, without long-term current use of insulin (H)  Patient does not regularly check her blood sugars.  She has been having episodes of lightheadedness, see below.  We discussed possible etiology of her lightheadedness as over control of her diabetes, hypoglycemic episodes.  Currently, she is prescribed 500 mg of metformin XR daily as well as 1 mg of Ozempic weekly.  A1c returned in normal range, concerning for hypoglycemia as cause of her lightheadedness.  Recommended to patient that we either discontinue the metformin or decrease the Ozempic, though may need to do both at some point.  Awaiting patient's reply.  -     HEMOGLOBIN A1C; Future  -     Albumin Random Urine Quantitative with Creat Ratio; Future  -     Alcohol Swabs (ALCOHOL PADS) 70 % PADS; 1 Pad once a week  -     Albumin Random Urine Quantitative with Creat Ratio  -     HEMOGLOBIN A1C  -     FOOT EXAM    Lightheadedness  Patient has been having increased lightheadedness.  Is become more persistent over the last month.  Earlier this year, we did discontinue the lisinopril as this was thought to be contributing to the lightheadedness she was experiencing.  She has had decreased renal function with GFR in the upper 50s, lower 60s, however has not had proteinuria, so reasonable to discontinue ACE inhibitor.   "Regarding lightheadedness, below workup is obtained today.  As noted above, concerned that these are hypoglycemic episodes more than anything.  Diabetes medication adjustment will be continued as noted above.  -     Comprehensive metabolic panel; Future  -     CBC with Platelets and Reflex to Iron Studies; Future  -     Magnesium; Future  -     Phosphorus; Future  -     Phosphorus  -     Magnesium  -     CBC with Platelets and Reflex to Iron Studies  -     Comprehensive metabolic panel    Hyperlipidemia LDL goal <100  Due for lipid panel.  Currently on atorvastatin.  -     Lipid panel reflex to direct LDL Fasting; Future  -     Lipid panel reflex to direct LDL Fasting    Chronic pain syndrome  Patient uses Percocet intermittently when back pain flares.  She did have an increased episode about a month and 1/2 to 2 months ago which she reports was related to a malalignment of a chair at work.  Once that was discovered, back pain has been improving.  A UDS was obtained today and controlled substance agreement was signed.  -     [id number] - Urine Drug Confirmation Panel (Comprehensive); Future  -     [id number] - Urine Drug Confirmation Panel (Comprehensive)    Other orders  -     HEPATITIS B, ADULT 20+ (ENGERIX-B/RECOMBIVAX HB)  -     Pneumococcal 20 Valent Conjugate (Prevnar 20)  -     PRIMARY CARE FOLLOW-UP SCHEDULING; Future        Patient has been advised of split billing requirements and indicates understanding: Yes      COUNSELING:  Reviewed preventive health counseling, as reflected in patient instructions      BMI:   Estimated body mass index is 41.65 kg/m  as calculated from the following:    Height as of this encounter: 1.746 m (5' 8.75\").    Weight as of this encounter: 127 kg (280 lb).       She reports that she quit smoking about 33 years ago. Her smoking use included cigarettes. She started smoking about 41 years ago. She has a 2.5 pack-year smoking history. She has never been exposed to tobacco smoke. She has " never used smokeless tobacco.          Rebecca Carcamo MD  [facility]

## 2024-01-03 LAB — GABAPENTIN UR QL CFM: PRESENT

## 2024-01-26 ENCOUNTER — ALLIED HEALTH/NURSE VISIT (OUTPATIENT)
Dept: FAMILY MEDICINE | Facility: CLINIC | Age: 59
End: 2024-01-26
Payer: COMMERCIAL

## 2024-01-26 DIAGNOSIS — Z23 ENCOUNTER FOR IMMUNIZATION: Primary | ICD-10-CM

## 2024-01-26 PROCEDURE — 90746 HEPB VACCINE 3 DOSE ADULT IM: CPT

## 2024-01-26 PROCEDURE — 99207 PR NO CHARGE NURSE ONLY: CPT

## 2024-01-26 PROCEDURE — 90471 IMMUNIZATION ADMIN: CPT

## 2024-01-26 NOTE — PROGRESS NOTES
Prior to immunization administration, verified patients identity using patient s name and date of birth. Please see Immunization Activity for additional information.     Screening Questionnaire for Adult Immunization    Are you sick today?   No   Do you have allergies to medications, food, a vaccine component or latex?   No   Have you ever had a serious reaction after receiving a vaccination?   No   Do you have a long-term health problem with heart, lung, kidney, or metabolic disease (e.g., diabetes), asthma, a blood disorder, no spleen, complement component deficiency, a cochlear implant, or a spinal fluid leak?  Are you on long-term aspirin therapy?   No   Do you have cancer, leukemia, HIV/AIDS, or any other immune system problem?   No   Do you have a parent, brother, or sister with an immune system problem?   No   In the past 3 months, have you taken medications that affect  your immune system, such as prednisone, other steroids, or anticancer drugs; drugs for the treatment of rheumatoid arthritis, Crohn s disease, or psoriasis; or have you had radiation treatments?   No   Have you had a seizure, or a brain or other nervous system problem?   No   During the past year, have you received a transfusion of blood or blood    products, or been given immune (gamma) globulin or antiviral drug?   No   For women: Are you pregnant or is there a chance you could become       pregnant during the next month?   No   Have you received any vaccinations in the past 4 weeks?   No     Immunization questionnaire answers were all negative.    I have reviewed the following standing orders:   This patient is due and qualifies for the Hepatitis B vaccine.    Click here for Hepatitis B Standing Order    I have reviewed the vaccines inclusion and exclusion criteria; No concerns regarding eligibility.     Patient instructed to remain in clinic for 15 minutes afterwards, and to report any adverse reactions.     Screening performed by Madyson  LUIS ALBERTO Pan, CMA on 1/26/2024 at 1:32 PM.

## 2024-01-30 ENCOUNTER — TRANSFERRED RECORDS (OUTPATIENT)
Dept: MULTI SPECIALTY CLINIC | Facility: CLINIC | Age: 59
End: 2024-01-30
Payer: COMMERCIAL

## 2024-01-30 LAB — RETINOPATHY: NORMAL

## 2024-02-01 ENCOUNTER — TELEPHONE (OUTPATIENT)
Dept: FAMILY MEDICINE | Facility: CLINIC | Age: 59
End: 2024-02-01
Payer: COMMERCIAL

## 2024-02-01 NOTE — TELEPHONE ENCOUNTER
Patient Quality Outreach    Patient is due for the following:   Diabetes -  Eye Exam    Next Steps:   Chart routed to abstraction.      Type of outreach:    Chart review performed, no outreach needed.      Questions for provider review:    None           Jennifer Restrepo MA

## 2024-02-19 ENCOUNTER — MYC REFILL (OUTPATIENT)
Dept: FAMILY MEDICINE | Facility: CLINIC | Age: 59
End: 2024-02-19
Payer: COMMERCIAL

## 2024-02-19 DIAGNOSIS — G89.29 CHRONIC LEFT-SIDED LOW BACK PAIN WITH LEFT-SIDED SCIATICA: ICD-10-CM

## 2024-02-19 DIAGNOSIS — G89.4 CHRONIC PAIN SYNDROME: ICD-10-CM

## 2024-02-19 DIAGNOSIS — M54.42 CHRONIC LEFT-SIDED LOW BACK PAIN WITH LEFT-SIDED SCIATICA: ICD-10-CM

## 2024-02-20 RX ORDER — HYDROCODONE BITARTRATE AND ACETAMINOPHEN 5; 325 MG/1; MG/1
1 TABLET ORAL EVERY 6 HOURS PRN
Qty: 25 TABLET | Refills: 0 | Status: SHIPPED | OUTPATIENT
Start: 2024-02-20 | End: 2024-06-18

## 2024-02-21 ENCOUNTER — MYC MEDICAL ADVICE (OUTPATIENT)
Dept: FAMILY MEDICINE | Facility: CLINIC | Age: 59
End: 2024-02-21
Payer: COMMERCIAL

## 2024-02-21 DIAGNOSIS — R29.6 RECURRENT FALLS: Primary | ICD-10-CM

## 2024-02-26 ENCOUNTER — HOSPITAL ENCOUNTER (OUTPATIENT)
Dept: ULTRASOUND IMAGING | Facility: CLINIC | Age: 59
Discharge: HOME | End: 2024-02-26
Attending: FAMILY MEDICINE | Admitting: FAMILY MEDICINE
Payer: COMMERCIAL

## 2024-02-26 ENCOUNTER — OFFICE VISIT (OUTPATIENT)
Dept: FAMILY MEDICINE | Facility: CLINIC | Age: 59
End: 2024-02-26
Payer: COMMERCIAL

## 2024-02-26 VITALS
DIASTOLIC BLOOD PRESSURE: 80 MMHG | TEMPERATURE: 97.3 F | OXYGEN SATURATION: 95 % | HEART RATE: 96 BPM | HEIGHT: 70 IN | RESPIRATION RATE: 20 BRPM | WEIGHT: 284.1 LBS | SYSTOLIC BLOOD PRESSURE: 128 MMHG | BODY MASS INDEX: 40.67 KG/M2

## 2024-02-26 DIAGNOSIS — M79.89 RIGHT LEG SWELLING: ICD-10-CM

## 2024-02-26 DIAGNOSIS — M79.89 RIGHT LEG SWELLING: Primary | ICD-10-CM

## 2024-02-26 PROCEDURE — 93971 EXTREMITY STUDY: CPT | Mod: RT

## 2024-02-26 PROCEDURE — 99213 OFFICE O/P EST LOW 20 MIN: CPT | Performed by: FAMILY MEDICINE

## 2024-02-26 ASSESSMENT — PAIN SCALES - GENERAL: PAINLEVEL: MODERATE PAIN (5)

## 2024-02-26 NOTE — PROGRESS NOTES
"  Assessment & Plan     (M79.89) Right leg swelling  (primary encounter diagnosis)  Comment: Ultrasound was negative for cyst or DVT. Recommend compression and also icing, if symptoms persist recommend ortho evaluation.   Plan: US Lower Extremity Venous Duplex Right                    BMI  Estimated body mass index is 41.35 kg/m  as calculated from the following:    Height as of this encounter: 1.765 m (5' 9.5\").    Weight as of this encounter: 128.9 kg (284 lb 1.6 oz).         FUTURE APPOINTMENTS:       - Follow-up visit if symptoms do not improve.     Mitch Bliss is a 58 year old, presenting for the following health issues:    Patient is a 58-year-old female presenting to the clinic for swelling behind the right knee.  She first noticed the swelling about a week or 2 ago and it has been painful causing some discomfort with certain positions like flexing her knee.  She also reports that when she sits and gets up from a sitting position there is pain.  There is no warmth or redness around the swelling.  Her leg has not felt unusually heavy since she noticed the swelling.  She reports no trauma.  She has had her knee replaced about 10 years ago.  She has not had any issues since her knee was replaced.      Knee Pain (Right knee, behind the knee)      2/26/2024     2:25 PM   Additional Questions   Roomed by Antonette Grey   Accompanied by self         2/26/2024     2:25 PM   Patient Reported Additional Medications   Patient reports taking the following new medications none     Knee Pain    History of Present Illness       Reason for visit:  Pain in the back of my right knee  Symptom onset:  1-2 weeks ago  Symptoms include:  Swelling and pain  Symptom intensity:  Moderate  Symptom progression:  Worsening  Had these symptoms before:  No    She eats 0-1 servings of fruits and vegetables daily.She consumes 0 sweetened beverage(s) daily.She exercises with enough effort to increase her heart rate 9 or less minutes " "per day.  She exercises with enough effort to increase her heart rate 3 or less days per week.   She is taking medications regularly.                 Review of Systems  CONSTITUTIONAL: NEGATIVE for fever, chills, change in weight  INTEGUMENTARY/SKIN: NEGATIVE for worrisome rashes, moles or lesions  ENT/MOUTH: NEGATIVE for ear, mouth and throat problems  RESP: NEGATIVE for significant cough or SOB  CV: NEGATIVE for chest pain, palpitations or peripheral edema  MUSCULOSKELETAL: POSITIVE  for arthralgias knee pain , and lump behind right knee  ENDOCRINE: NEGATIVE for temperature intolerance, skin/hair changes  HEME/ALLERGY/IMMUNE: NEGATIVE for bleeding problems  PSYCHIATRIC: NEGATIVE for changes in mood or affect      Objective    /80 (BP Location: Right arm, Patient Position: Sitting, Cuff Size: Adult Large)   Pulse 96   Temp 97.3  F (36.3  C) (Tympanic)   Resp 20   Ht 1.765 m (5' 9.5\")   Wt 128.9 kg (284 lb 1.6 oz)   LMP  (LMP Unknown)   SpO2 95%   BMI 41.35 kg/m    Body mass index is 41.35 kg/m .  Physical Exam   GENERAL: alert and no distress  EYES: Eyes grossly normal to inspection, PERRL and conjunctivae and sclerae normal  HENT: ear canals and TM's normal, nose and mouth without ulcers or lesions  NECK: no adenopathy, no asymmetry, masses, or scars  RESP: lungs clear to auscultation - no rales, rhonchi or wheezes  CV: regular rate and rhythm, normal S1 S2, no S3 or S4, no murmur, click or rub, no peripheral edema  MS: normal range of motion and tenderness to palpation and swelling posterior to right knee.            Signed Electronically by: Blanca Donnelly MD    "

## 2024-03-12 ENCOUNTER — ANCILLARY PROCEDURE (OUTPATIENT)
Dept: MRI IMAGING | Facility: CLINIC | Age: 59
End: 2024-03-12
Attending: STUDENT IN AN ORGANIZED HEALTH CARE EDUCATION/TRAINING PROGRAM
Payer: COMMERCIAL

## 2024-03-12 DIAGNOSIS — R29.6 RECURRENT FALLS: ICD-10-CM

## 2024-03-12 PROCEDURE — 70553 MRI BRAIN STEM W/O & W/DYE: CPT | Mod: TC | Performed by: RADIOLOGY

## 2024-03-12 PROCEDURE — A9585 GADOBUTROL INJECTION: HCPCS | Performed by: RADIOLOGY

## 2024-03-12 RX ORDER — GADOBUTROL 604.72 MG/ML
0.01 INJECTION INTRAVENOUS ONCE
Status: COMPLETED | OUTPATIENT
Start: 2024-03-12 | End: 2024-03-12

## 2024-03-12 RX ADMIN — GADOBUTROL 12.9 ML: 604.72 INJECTION INTRAVENOUS at 13:45

## 2024-04-20 ENCOUNTER — HEALTH MAINTENANCE LETTER (OUTPATIENT)
Age: 59
End: 2024-04-20

## 2024-05-08 DIAGNOSIS — M54.42 CHRONIC BILATERAL LOW BACK PAIN WITH BILATERAL SCIATICA: ICD-10-CM

## 2024-05-08 DIAGNOSIS — G89.29 CHRONIC BILATERAL LOW BACK PAIN WITH BILATERAL SCIATICA: ICD-10-CM

## 2024-05-08 DIAGNOSIS — M54.41 CHRONIC BILATERAL LOW BACK PAIN WITH BILATERAL SCIATICA: ICD-10-CM

## 2024-05-08 DIAGNOSIS — G89.4 CHRONIC PAIN SYNDROME: ICD-10-CM

## 2024-05-09 RX ORDER — CYCLOBENZAPRINE HCL 10 MG
10 TABLET ORAL 3 TIMES DAILY PRN
Qty: 270 TABLET | Refills: 0 | Status: SHIPPED | OUTPATIENT
Start: 2024-05-09 | End: 2024-10-01

## 2024-06-15 DIAGNOSIS — F33.1 MAJOR DEPRESSIVE DISORDER, RECURRENT EPISODE, MODERATE (H): ICD-10-CM

## 2024-06-17 RX ORDER — BUPROPION HYDROCHLORIDE 150 MG/1
TABLET, EXTENDED RELEASE ORAL
Qty: 270 TABLET | Refills: 1 | Status: SHIPPED | OUTPATIENT
Start: 2024-06-17

## 2024-06-18 ENCOUNTER — MYC REFILL (OUTPATIENT)
Dept: FAMILY MEDICINE | Facility: CLINIC | Age: 59
End: 2024-06-18
Payer: COMMERCIAL

## 2024-06-18 DIAGNOSIS — G89.4 CHRONIC PAIN SYNDROME: ICD-10-CM

## 2024-06-18 DIAGNOSIS — G89.29 CHRONIC LEFT-SIDED LOW BACK PAIN WITH LEFT-SIDED SCIATICA: ICD-10-CM

## 2024-06-18 DIAGNOSIS — M54.42 CHRONIC LEFT-SIDED LOW BACK PAIN WITH LEFT-SIDED SCIATICA: ICD-10-CM

## 2024-06-19 RX ORDER — HYDROCODONE BITARTRATE AND ACETAMINOPHEN 5; 325 MG/1; MG/1
1 TABLET ORAL EVERY 6 HOURS PRN
Qty: 25 TABLET | Refills: 0 | Status: SHIPPED | OUTPATIENT
Start: 2024-06-19 | End: 2024-09-30

## 2024-06-21 DIAGNOSIS — E66.01 MORBID (SEVERE) OBESITY DUE TO EXCESS CALORIES (H): ICD-10-CM

## 2024-06-21 DIAGNOSIS — E11.29 TYPE 2 DIABETES MELLITUS WITH OTHER DIABETIC KIDNEY COMPLICATION (H): ICD-10-CM

## 2024-06-24 RX ORDER — SEMAGLUTIDE 1.34 MG/ML
INJECTION, SOLUTION SUBCUTANEOUS
Qty: 3 ML | Refills: 11 | Status: SHIPPED | OUTPATIENT
Start: 2024-06-24

## 2024-06-28 ENCOUNTER — OFFICE VISIT (OUTPATIENT)
Dept: FAMILY MEDICINE | Facility: CLINIC | Age: 59
End: 2024-06-28
Payer: COMMERCIAL

## 2024-06-28 VITALS
HEART RATE: 86 BPM | SYSTOLIC BLOOD PRESSURE: 114 MMHG | DIASTOLIC BLOOD PRESSURE: 80 MMHG | BODY MASS INDEX: 41.92 KG/M2 | WEIGHT: 288 LBS | RESPIRATION RATE: 14 BRPM | OXYGEN SATURATION: 99 % | TEMPERATURE: 97 F

## 2024-06-28 DIAGNOSIS — F33.1 MAJOR DEPRESSIVE DISORDER, RECURRENT EPISODE, MODERATE (H): ICD-10-CM

## 2024-06-28 DIAGNOSIS — E66.813 CLASS 3 SEVERE OBESITY DUE TO EXCESS CALORIES WITH SERIOUS COMORBIDITY AND BODY MASS INDEX (BMI) OF 40.0 TO 44.9 IN ADULT (H): ICD-10-CM

## 2024-06-28 DIAGNOSIS — L60.9 NAIL ABNORMALITY: ICD-10-CM

## 2024-06-28 DIAGNOSIS — R29.898 HAND WEAKNESS: ICD-10-CM

## 2024-06-28 DIAGNOSIS — N18.31 STAGE 3A CHRONIC KIDNEY DISEASE (H): ICD-10-CM

## 2024-06-28 DIAGNOSIS — E66.01 CLASS 3 SEVERE OBESITY DUE TO EXCESS CALORIES WITH SERIOUS COMORBIDITY AND BODY MASS INDEX (BMI) OF 40.0 TO 44.9 IN ADULT (H): ICD-10-CM

## 2024-06-28 DIAGNOSIS — E11.9 TYPE 2 DIABETES MELLITUS WITHOUT COMPLICATION, WITHOUT LONG-TERM CURRENT USE OF INSULIN (H): Primary | ICD-10-CM

## 2024-06-28 LAB
ANION GAP SERPL CALCULATED.3IONS-SCNC: 10 MMOL/L (ref 7–15)
BUN SERPL-MCNC: 17 MG/DL (ref 6–20)
CALCIUM SERPL-MCNC: 9.6 MG/DL (ref 8.6–10)
CHLORIDE SERPL-SCNC: 104 MMOL/L (ref 98–107)
CREAT SERPL-MCNC: 0.99 MG/DL (ref 0.51–0.95)
DEPRECATED HCO3 PLAS-SCNC: 28 MMOL/L (ref 22–29)
EGFRCR SERPLBLD CKD-EPI 2021: 66 ML/MIN/1.73M2
FERRITIN SERPL-MCNC: 101 NG/ML (ref 11–328)
GLUCOSE SERPL-MCNC: 83 MG/DL (ref 70–99)
HBA1C MFR BLD: 5.5 % (ref 0–5.6)
IRON BINDING CAPACITY (ROCHE): 326 UG/DL (ref 240–430)
IRON SATN MFR SERPL: 31 % (ref 15–46)
IRON SERPL-MCNC: 100 UG/DL (ref 37–145)
POTASSIUM SERPL-SCNC: 4.4 MMOL/L (ref 3.4–5.3)
SODIUM SERPL-SCNC: 142 MMOL/L (ref 135–145)
T4 FREE SERPL-MCNC: 1.03 NG/DL (ref 0.9–1.7)
TSH SERPL DL<=0.005 MIU/L-ACNC: 6.42 UIU/ML (ref 0.3–4.2)

## 2024-06-28 PROCEDURE — 84443 ASSAY THYROID STIM HORMONE: CPT | Performed by: STUDENT IN AN ORGANIZED HEALTH CARE EDUCATION/TRAINING PROGRAM

## 2024-06-28 PROCEDURE — 82728 ASSAY OF FERRITIN: CPT | Performed by: STUDENT IN AN ORGANIZED HEALTH CARE EDUCATION/TRAINING PROGRAM

## 2024-06-28 PROCEDURE — 99214 OFFICE O/P EST MOD 30 MIN: CPT | Mod: 25 | Performed by: STUDENT IN AN ORGANIZED HEALTH CARE EDUCATION/TRAINING PROGRAM

## 2024-06-28 PROCEDURE — 83540 ASSAY OF IRON: CPT | Performed by: STUDENT IN AN ORGANIZED HEALTH CARE EDUCATION/TRAINING PROGRAM

## 2024-06-28 PROCEDURE — 80048 BASIC METABOLIC PNL TOTAL CA: CPT | Performed by: STUDENT IN AN ORGANIZED HEALTH CARE EDUCATION/TRAINING PROGRAM

## 2024-06-28 PROCEDURE — 90471 IMMUNIZATION ADMIN: CPT | Performed by: STUDENT IN AN ORGANIZED HEALTH CARE EDUCATION/TRAINING PROGRAM

## 2024-06-28 PROCEDURE — 83550 IRON BINDING TEST: CPT | Performed by: STUDENT IN AN ORGANIZED HEALTH CARE EDUCATION/TRAINING PROGRAM

## 2024-06-28 PROCEDURE — 83036 HEMOGLOBIN GLYCOSYLATED A1C: CPT | Performed by: STUDENT IN AN ORGANIZED HEALTH CARE EDUCATION/TRAINING PROGRAM

## 2024-06-28 PROCEDURE — 90746 HEPB VACCINE 3 DOSE ADULT IM: CPT | Performed by: STUDENT IN AN ORGANIZED HEALTH CARE EDUCATION/TRAINING PROGRAM

## 2024-06-28 PROCEDURE — 84439 ASSAY OF FREE THYROXINE: CPT | Performed by: STUDENT IN AN ORGANIZED HEALTH CARE EDUCATION/TRAINING PROGRAM

## 2024-06-28 PROCEDURE — 36415 COLL VENOUS BLD VENIPUNCTURE: CPT | Performed by: STUDENT IN AN ORGANIZED HEALTH CARE EDUCATION/TRAINING PROGRAM

## 2024-06-28 ASSESSMENT — ANXIETY QUESTIONNAIRES
GAD7 TOTAL SCORE: 0
2. NOT BEING ABLE TO STOP OR CONTROL WORRYING: NOT AT ALL
7. FEELING AFRAID AS IF SOMETHING AWFUL MIGHT HAPPEN: NOT AT ALL
3. WORRYING TOO MUCH ABOUT DIFFERENT THINGS: NOT AT ALL
IF YOU CHECKED OFF ANY PROBLEMS ON THIS QUESTIONNAIRE, HOW DIFFICULT HAVE THESE PROBLEMS MADE IT FOR YOU TO DO YOUR WORK, TAKE CARE OF THINGS AT HOME, OR GET ALONG WITH OTHER PEOPLE: NOT DIFFICULT AT ALL
6. BECOMING EASILY ANNOYED OR IRRITABLE: NOT AT ALL
8. IF YOU CHECKED OFF ANY PROBLEMS, HOW DIFFICULT HAVE THESE MADE IT FOR YOU TO DO YOUR WORK, TAKE CARE OF THINGS AT HOME, OR GET ALONG WITH OTHER PEOPLE?: NOT DIFFICULT AT ALL
1. FEELING NERVOUS, ANXIOUS, OR ON EDGE: NOT AT ALL
7. FEELING AFRAID AS IF SOMETHING AWFUL MIGHT HAPPEN: NOT AT ALL
5. BEING SO RESTLESS THAT IT IS HARD TO SIT STILL: NOT AT ALL
GAD7 TOTAL SCORE: 0
4. TROUBLE RELAXING: NOT AT ALL
GAD7 TOTAL SCORE: 0

## 2024-06-28 ASSESSMENT — PATIENT HEALTH QUESTIONNAIRE - PHQ9
SUM OF ALL RESPONSES TO PHQ QUESTIONS 1-9: 1
10. IF YOU CHECKED OFF ANY PROBLEMS, HOW DIFFICULT HAVE THESE PROBLEMS MADE IT FOR YOU TO DO YOUR WORK, TAKE CARE OF THINGS AT HOME, OR GET ALONG WITH OTHER PEOPLE: NOT DIFFICULT AT ALL
SUM OF ALL RESPONSES TO PHQ QUESTIONS 1-9: 1

## 2024-06-28 ASSESSMENT — PAIN SCALES - GENERAL: PAINLEVEL: NO PAIN (0)

## 2024-06-28 ASSESSMENT — ENCOUNTER SYMPTOMS: NERVOUS/ANXIOUS: 1

## 2024-06-28 NOTE — PROGRESS NOTES
Assessment & Plan     Type 2 diabetes mellitus without complication, without long-term current use of insulin (H)  Patient is a very pleasant 58-year-old female who presents today for follow-up on diabetes.  Patient has noted that she has not been as strict with dietary intake.  Has not been as active as she would like.  Continue to encourage her along these goals.  An A1c was obtained today and is still well-controlled at 5.5.  Occasionally with feelings of low blood sugars, however not severe and she is able to handle things without issue.  No adjustment to Ozempic dosage at this time.  - HEMOGLOBIN A1C  - HEMOGLOBIN A1C    Stage 3a chronic kidney disease (H)  History of CKD stage IIIa, BMP is obtained today.  Improved from previous.  - Basic metabolic panel  - Basic metabolic panel    Class 3 severe obesity due to excess calories with serious comorbidity and body mass index (BMI) of 40.0 to 44.9 in adult (H)  As above, patient continues to work on lifestyle changes.    Major depressive disorder, recurrent episode, moderate (H)  Mental health has been stable.  No adjustment to medication therapy.    Nail abnormality  Patient notes that she has had both great toe toenails lift up.  She Went away to the point where she ended up losing the toenail, though it is grown back now.  There is a ridge, and I encouraged her to use foot soaks to help keep the skin of the foot soft and avoid ingrown toenail and to encourage better growth.  We did obtain the below workup today, which did reveal subclinical hypothyroidism.  Plan to monitor at this point.  - Iron and iron binding capacity  - Ferritin  - TSH with free T4 reflex  - Iron and iron binding capacity  - Ferritin  - TSH with free T4 reflex  - T4 free    Hand weakness  Patient has bilateral hand weakness, status post carpal tunnel surgery.  She was offered referral to hand therapy for  strength improvement, she will hold off at this time, however I am happy to place  "referral at any time if she requests.  In addition, consider EMG.    The longitudinal plan of care for the diagnosis(es)/condition(s) as documented were addressed during this visit. Due to the added complexity in care, I will continue to support Ruthy in the subsequent management and with ongoing continuity of care.  BMI  Estimated body mass index is 41.92 kg/m  as calculated from the following:    Height as of 2/26/24: 1.765 m (5' 9.5\").    Weight as of this encounter: 130.6 kg (288 lb).       Subjective   Ruthy is a 58 year old, presenting for the following health issues:  Diabetes, Depression, and Anxiety        6/28/2024    12:42 PM   Additional Questions   Roomed by Kisha VIGIL   Accompanied by Self         6/28/2024    12:42 PM   Patient Reported Additional Medications   Patient reports taking the following new medications .     History of Present Illness       Mental Health Follow-up:  Patient presents to follow-up on Depression.Patient's depression since last visit has been:  Good  The patient is not having other symptoms associated with depression.      Any significant life events: No  Patient is not feeling anxious or having panic attacks.  Patient has no concerns about alcohol or drug use.    Diabetes:   She presents for follow up of diabetes.  She is checking home blood glucose a few times a month.   She checks blood glucose before and after meals.  Blood glucose is never over 200 and sometimes under 70. She is aware of hypoglycemia symptoms including shakiness and weakness.    She has no concerns regarding her diabetes at this time.  She is having numbness in feet.            She eats 0-1 servings of fruits and vegetables daily.She consumes 0 sweetened beverage(s) daily.She exercises with enough effort to increase her heart rate 9 or less minutes per day.  She exercises with enough effort to increase her heart rate 3 or less days per week.   She is taking medications regularly.       Some dizziness " episodes, sugars under 70.   Does get symptomatically low.     Hyperlipidemia Follow-Up    Are you regularly taking any medication or supplement to lower your cholesterol?   Yes- Lipitor  Are you having muscle aches or other side effects that you think could be caused by your cholesterol lowering medication?  No          8/10/2023     4:41 PM 12/29/2023     8:32 AM 6/28/2024    12:39 PM   PHQ   PHQ-9 Total Score 0 0 1   Q9: Thoughts of better off dead/self-harm past 2 weeks Not at all Not at all Not at all         8/10/2023     4:41 PM 12/29/2023     8:32 AM 6/28/2024    12:40 PM   ANDREW-7 SCORE   Total Score 0 (minimal anxiety) 0 (minimal anxiety) 0 (minimal anxiety)   Total Score 0 0 0       Tried to go down a little on gabapentin and almost started to cry and broke down at work. Overwhelmed at work.     Review of Systems  Constitutional, HEENT, cardiovascular, pulmonary, gi and gu systems are negative, except as otherwise noted.      Objective    /80   Pulse 86   Temp 97  F (36.1  C) (Tympanic)   Resp 14   Wt 130.6 kg (288 lb)   LMP  (LMP Unknown)   SpO2 99%   BMI 41.92 kg/m    Body mass index is 41.92 kg/m .  Physical Exam  Constitutional:       Appearance: Normal appearance.   HENT:      Head: Normocephalic.   Eyes:      General: No scleral icterus.     Extraocular Movements: Extraocular movements intact.      Conjunctiva/sclera: Conjunctivae normal.   Cardiovascular:      Rate and Rhythm: Normal rate.   Pulmonary:      Effort: Pulmonary effort is normal.   Feet:      Comments: Left great toenail thin, appears to be regrowing. Right great toe with signs of separation of the toenail from the toe. No erythema or drainage.   Neurological:      General: No focal deficit present.      Mental Status: She is alert and oriented to person, place, and time.   Psychiatric:         Mood and Affect: Affect is tearful (at times).           Results from this visit  Results for orders placed or performed in visit on  06/28/24   HEMOGLOBIN A1C     Status: Normal   Result Value Ref Range    Hemoglobin A1C 5.5 0.0 - 5.6 %   Basic metabolic panel     Status: Abnormal   Result Value Ref Range    Sodium 142 135 - 145 mmol/L    Potassium 4.4 3.4 - 5.3 mmol/L    Chloride 104 98 - 107 mmol/L    Carbon Dioxide (CO2) 28 22 - 29 mmol/L    Anion Gap 10 7 - 15 mmol/L    Urea Nitrogen 17.0 6.0 - 20.0 mg/dL    Creatinine 0.99 (H) 0.51 - 0.95 mg/dL    GFR Estimate 66 >60 mL/min/1.73m2    Calcium 9.6 8.6 - 10.0 mg/dL    Glucose 83 70 - 99 mg/dL   Iron and iron binding capacity     Status: Normal   Result Value Ref Range    Iron 100 37 - 145 ug/dL    Iron Binding Capacity 326 240 - 430 ug/dL    Iron Sat Index 31 15 - 46 %   Ferritin     Status: Normal   Result Value Ref Range    Ferritin 101 11 - 328 ng/mL   TSH with free T4 reflex     Status: Abnormal   Result Value Ref Range    TSH 6.42 (H) 0.30 - 4.20 uIU/mL   T4 free     Status: Normal   Result Value Ref Range    Free T4 1.03 0.90 - 1.70 ng/dL             Signed Electronically by: Rebecca Carcamo MD

## 2024-06-28 NOTE — NURSING NOTE
"Chief Complaint   Patient presents with    Diabetes    Depression    Anxiety     /80   Pulse 86   Temp 97  F (36.1  C) (Tympanic)   Resp 14   Wt 130.6 kg (288 lb)   LMP  (LMP Unknown)   SpO2 99%   BMI 41.92 kg/m   Estimated body mass index is 41.92 kg/m  as calculated from the following:    Height as of 2/26/24: 1.765 m (5' 9.5\").    Weight as of this encounter: 130.6 kg (288 lb).  Patient presents to the clinic using No DME      Health Maintenance that is potentially due pending provider review:    Health Maintenance Due   Topic Date Due    COVID-19 Vaccine (2 - 2023-24 season) 09/01/2023    A1C  03/29/2024    HEPATITIS B IMMUNIZATION (3 of 3 - 19+ 3-dose series) 06/29/2024                  "

## 2024-09-06 DIAGNOSIS — F33.0 MAJOR DEPRESSIVE DISORDER, RECURRENT, MILD (H): ICD-10-CM

## 2024-09-06 RX ORDER — GABAPENTIN 100 MG/1
100-200 CAPSULE ORAL AT BEDTIME
Qty: 180 CAPSULE | Refills: 2 | Status: SHIPPED | OUTPATIENT
Start: 2024-09-06

## 2024-09-10 DIAGNOSIS — E78.5 HYPERLIPIDEMIA LDL GOAL <100: ICD-10-CM

## 2024-09-10 RX ORDER — ATORVASTATIN CALCIUM 20 MG/1
20 TABLET, FILM COATED ORAL DAILY
Qty: 90 TABLET | Refills: 0 | Status: SHIPPED | OUTPATIENT
Start: 2024-09-10

## 2024-09-10 NOTE — TELEPHONE ENCOUNTER
Needs clinic appointment for further refills.   Prescription approved per [facility] Refill Protocol.  Julie Behrendt RN

## 2024-09-30 ENCOUNTER — MYC REFILL (OUTPATIENT)
Dept: FAMILY MEDICINE | Facility: CLINIC | Age: 59
End: 2024-09-30
Payer: COMMERCIAL

## 2024-09-30 DIAGNOSIS — M54.42 CHRONIC LEFT-SIDED LOW BACK PAIN WITH LEFT-SIDED SCIATICA: ICD-10-CM

## 2024-09-30 DIAGNOSIS — G89.29 CHRONIC LEFT-SIDED LOW BACK PAIN WITH LEFT-SIDED SCIATICA: ICD-10-CM

## 2024-09-30 DIAGNOSIS — L70.0 ACNE VULGARIS: ICD-10-CM

## 2024-09-30 DIAGNOSIS — G89.4 CHRONIC PAIN SYNDROME: ICD-10-CM

## 2024-10-01 DIAGNOSIS — G89.4 CHRONIC PAIN SYNDROME: ICD-10-CM

## 2024-10-01 DIAGNOSIS — G89.29 CHRONIC BILATERAL LOW BACK PAIN WITH BILATERAL SCIATICA: ICD-10-CM

## 2024-10-01 DIAGNOSIS — M54.41 CHRONIC BILATERAL LOW BACK PAIN WITH BILATERAL SCIATICA: ICD-10-CM

## 2024-10-01 DIAGNOSIS — M54.42 CHRONIC BILATERAL LOW BACK PAIN WITH BILATERAL SCIATICA: ICD-10-CM

## 2024-10-01 RX ORDER — CYCLOBENZAPRINE HCL 10 MG
10 TABLET ORAL 3 TIMES DAILY
Qty: 270 TABLET | Refills: 0 | Status: SHIPPED | OUTPATIENT
Start: 2024-10-01

## 2024-10-01 RX ORDER — CLINDAMYCIN PHOSPHATE 11.9 MG/ML
SOLUTION TOPICAL 2 TIMES DAILY PRN
Qty: 60 ML | Refills: 1 | Status: SHIPPED | OUTPATIENT
Start: 2024-10-01

## 2024-10-02 RX ORDER — HYDROCODONE BITARTRATE AND ACETAMINOPHEN 5; 325 MG/1; MG/1
1 TABLET ORAL EVERY 6 HOURS PRN
Qty: 25 TABLET | Refills: 0 | Status: SHIPPED | OUTPATIENT
Start: 2024-10-02

## 2024-10-03 ENCOUNTER — MYC MEDICAL ADVICE (OUTPATIENT)
Dept: FAMILY MEDICINE | Facility: CLINIC | Age: 59
End: 2024-10-03
Payer: COMMERCIAL

## 2024-10-03 DIAGNOSIS — Z71.1 CONCERN ABOUT MEMORY: Primary | ICD-10-CM

## 2024-10-04 ENCOUNTER — IMMUNIZATION (OUTPATIENT)
Dept: FAMILY MEDICINE | Facility: CLINIC | Age: 59
End: 2024-10-04
Payer: COMMERCIAL

## 2024-10-04 PROCEDURE — 90673 RIV3 VACCINE NO PRESERV IM: CPT

## 2024-10-04 PROCEDURE — 90471 IMMUNIZATION ADMIN: CPT

## 2024-10-10 ENCOUNTER — MYC MEDICAL ADVICE (OUTPATIENT)
Dept: FAMILY MEDICINE | Facility: CLINIC | Age: 59
End: 2024-10-10
Payer: COMMERCIAL

## 2024-10-11 ENCOUNTER — THERAPY VISIT (OUTPATIENT)
Dept: OCCUPATIONAL THERAPY | Facility: REHABILITATION | Age: 59
End: 2024-10-11
Attending: STUDENT IN AN ORGANIZED HEALTH CARE EDUCATION/TRAINING PROGRAM
Payer: COMMERCIAL

## 2024-10-11 DIAGNOSIS — Z71.1 CONCERN ABOUT MEMORY: ICD-10-CM

## 2024-10-11 DIAGNOSIS — Z78.9 IMPAIRED INSTRUMENTAL ACTIVITIES OF DAILY LIVING: Primary | ICD-10-CM

## 2024-10-11 PROCEDURE — 97165 OT EVAL LOW COMPLEX 30 MIN: CPT | Mod: GO | Performed by: OCCUPATIONAL THERAPIST

## 2024-10-11 PROCEDURE — 97530 THERAPEUTIC ACTIVITIES: CPT | Mod: GO | Performed by: OCCUPATIONAL THERAPIST

## 2024-10-11 NOTE — PROGRESS NOTES
OCCUPATIONAL THERAPY EVALUATION  Type of Visit: Evaluation       Fall Risk Screen:  Fall screen completed by: OT  Have you fallen 2 or more times in the past year?: No  Have you fallen and had an injury in the past year?: No  Is patient a fall risk?: No    Subjective      Presenting condition or subjective complaint: I m having trouble with multitasking, remembering things/words, making mistakes at work that I normally wouldn t make. Patient states that symptoms have been going on since about March this year. Headaches increased(has long history of Migraines but more frequent now).   Date of onset: 03/01/24    Relevant medical history: Arthritis; Depression; Diabetes; Migraines or headaches; Osteoarthritis; Overweight   Dates & types of surgery: 2023 carpal tunnel both wrists, 2022 another type of wrist procedure, 2015 lasik  surgery, 2012 bladder hammock/sling surgery, 2013 both knees replaced, 2002 tubal ligation, 2010 tendon repair left foot,    Prior diagnostic imaging/testing results: CT scan     Prior therapy history for the same diagnosis, illness or injury: No      Prior Level of Function  Transfers: Independent  Ambulation: Independent  ADL: Independent  IADL: Independent    Living Environment  Social support: Alone   Type of home: Apartment/condo   Stairs to enter the home: No       Ramp: No   Stairs inside the home: No       Help at home: None  Equipment owned: Straight Cane; Walker with wheels     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 Hoping that we can figure out what s going on with my brain.    Pain assessment: chronic pain in back  and both hands-arthritis     Objective     Cognitive Status Examination  Orientation: Oriented to person, place and time   Level of Consciousness: Alert  Follows Commands and Answers Questions: 100% of the time  Personal Safety  and Judgement: Intact  Memory: Impaired  Attention: Selective attention impaired, difficulty ignoring irrelevant stimuli, Alternating attention impaired, difficulty shifting between tasks, Divided attention impaired, difficulty with simultaneous tasks  Organization/Problem Solving: No deficits identified  Executive Function: Working memory impaired, decreased storage of information for performing tasks    VISUAL SKILLS  Visual Acuity: No deficits identified, Wears glasses  Visual Field: Appears normal  Visual Attention: Appears normal  Oculomotor: NT    SENSATION: UE Sensation WNL, LE Sensation WNL, patient has very mild neuropathy    FUNCTIONAL MOBILITY  Assistive Device(s): None  Ambulation: Independent    ACTIVITY TOLERANCE: Good    BASIC and INSTRUMENTAL ACTIVITIES OF DAILY LIVING: Patient is independent with all BADL and IADL tasks however she is making more errors, taking increased length of time to complete tasks and relying on others to help more often.       Assessment & Plan   CLINICAL IMPRESSIONS  Medical Diagnosis: Concern about memory    Treatment Diagnosis: memory changes    Impression/Assessment: Pt is a 58 year old female presenting to Occupational Therapy due to memory changes.  The following significant findings have been identified: impaired memory.  These identified deficits interfere with their ability to perform work tasks as compared to previous level of function.     Clinical Decision Making (Complexity):  Assessment of Occupational Performance: 1-3 Performance Deficits  Occupational Performance Limitations: work, leisure activities, and social participation  Clinical Decision Making (Complexity): Low complexity    PLAN OF CARE  Treatment Interventions:  Interventions: Self-Care/Home Management, Therapeutic Activity    Long Term Goals   OT Goal 1  Goal Identifier: 10  Goal Description: Pt will verbalize understanding about strategies to help with improving attention (concentration) and  internal and external strategies to help with memory and recall into daily routine for improved ADL/IADL performance.  Target Date: 01/03/25      Frequency of Treatment: once a week  Duration of Treatment: 12 weeks     Recommended Referrals to Other Professionals: no  Education Assessment: Learner/Method: Patient     Risks and benefits of evaluation/treatment have been explained.   Patient agrees with Plan of Care.     Evaluation Time:    OT Eval, Low Complexity Minutes (37516): 30       Signing Clinician: Rosalia Morales OT

## 2024-11-08 ENCOUNTER — MYC MEDICAL ADVICE (OUTPATIENT)
Dept: FAMILY MEDICINE | Facility: CLINIC | Age: 59
End: 2024-11-08
Payer: COMMERCIAL

## 2024-11-12 NOTE — PROGRESS NOTES
DISCHARGE  Reason for Discharge: Patient has failed to schedule further appointments.    Equipment Issued: none    Discharge Plan: N/A    Referring Provider:  Rebecca Carcamo MD       10/11/24 [id number]   Appointment Info   Treating Provider Diann Morales OTR/L   Visits Used 1   Medical Diagnosis Concern about memory   OT Tx Diagnosis memory changes   Progress Note/Certification   Onset of Illness/Injury or Date of Surgery 03/01/24   Therapy Frequency once a week   Predicted Duration 12 weeks   Progress Note Due Date 01/03/25   Progress Note Completed Date 10/11/24   OT Goal 1   Goal Identifier 10   Goal Description Pt will verbalize understanding about strategies to help with improving attention (concentration) and internal and external strategies to help with memory and recall into daily routine for improved ADL/IADL performance.   Target Date 01/03/25   Therapeutic Activity   Therapeutic Activity Minutes (95413) 10   Ther Act 1 IADL skills/assessment   Ther Act 1 - Details MoCA  The [place] Cognitive Assessment (MOCA) is a 30 point cognitive screening assessment.  Version 8.3  Visuospatial/Executive /5  Naming 3/3  Attention 6/6  Language 2/3  Abstraction 2/2  Delayed Recall 5/5  Orientation 6/6  MIS: 15/15  Total: 29/30  Normal score is considered = or > 26/30.  The following ranges may be used to grade severity: 18-26 = mild cognitive impairment, 10-17= moderate cognitive impairment (MCI) and less than 10= severe cognitive impairment. These have not been researched. Average score for MCI is 22 and for mild Alzheimer's disease is 16.Patient instructed to slow things down when at work to allow increased time to process information. Use a calculator instead of performing daily charge totals in your head.   Skilled Intervention Instruct on and assess skills needed to perform IADL tasks safely and independently   Eval/Assessments   OT Eval, Low Complexity Minutes (71921) 30   Education   Learner/Method Patient    Plan   Plan for next session WCPA, strategies for attention and memory(handouts). Focus on talk it out loud ect   Total Session Time   Timed Code Treatment Minutes 10   Total Treatment Time (sum of timed and untimed services) 40

## 2024-11-16 ENCOUNTER — HEALTH MAINTENANCE LETTER (OUTPATIENT)
Age: 59
End: 2024-11-16

## 2024-12-03 ENCOUNTER — PATIENT OUTREACH (OUTPATIENT)
Dept: CARE COORDINATION | Facility: CLINIC | Age: 59
End: 2024-12-03
Payer: COMMERCIAL

## 2024-12-04 DIAGNOSIS — E78.5 HYPERLIPIDEMIA LDL GOAL <100: ICD-10-CM

## 2024-12-04 DIAGNOSIS — F33.1 MAJOR DEPRESSIVE DISORDER, RECURRENT EPISODE, MODERATE (H): ICD-10-CM

## 2024-12-04 RX ORDER — ATORVASTATIN CALCIUM 20 MG/1
20 TABLET, FILM COATED ORAL DAILY
Qty: 90 TABLET | Refills: 0 | Status: SHIPPED | OUTPATIENT
Start: 2024-12-04

## 2024-12-04 RX ORDER — BUPROPION HYDROCHLORIDE 150 MG/1
TABLET, EXTENDED RELEASE ORAL
Qty: 270 TABLET | Refills: 0 | Status: SHIPPED | OUTPATIENT
Start: 2024-12-04

## 2024-12-04 NOTE — TELEPHONE ENCOUNTER
Has upcoming appointment with Dr. Rebecca Romero on 12/12/24.  Prescription approved per [facility] Refill Protocol.  Julie Behrendt RN

## 2024-12-04 NOTE — TELEPHONE ENCOUNTER
[facility] refill given x 1, patient has upcoming appointment 12/12/24 with Dr. Rebecca Carcamo.  Prescription approved per [facility] Refill Protocol.  Julie Behrendt RN

## 2024-12-07 SDOH — HEALTH STABILITY: PHYSICAL HEALTH: ON AVERAGE, HOW MANY MINUTES DO YOU ENGAGE IN EXERCISE AT THIS LEVEL?: 0 MIN

## 2024-12-07 SDOH — HEALTH STABILITY: PHYSICAL HEALTH: ON AVERAGE, HOW MANY DAYS PER WEEK DO YOU ENGAGE IN MODERATE TO STRENUOUS EXERCISE (LIKE A BRISK WALK)?: 0 DAYS

## 2024-12-07 ASSESSMENT — SOCIAL DETERMINANTS OF HEALTH (SDOH): HOW OFTEN DO YOU GET TOGETHER WITH FRIENDS OR RELATIVES?: ONCE A WEEK

## 2024-12-12 ENCOUNTER — OFFICE VISIT (OUTPATIENT)
Dept: FAMILY MEDICINE | Facility: CLINIC | Age: 59
End: 2024-12-12
Payer: COMMERCIAL

## 2024-12-12 VITALS
DIASTOLIC BLOOD PRESSURE: 82 MMHG | RESPIRATION RATE: 14 BRPM | BODY MASS INDEX: 41.09 KG/M2 | OXYGEN SATURATION: 97 % | SYSTOLIC BLOOD PRESSURE: 120 MMHG | WEIGHT: 287 LBS | HEIGHT: 70 IN | HEART RATE: 92 BPM

## 2024-12-12 DIAGNOSIS — Z00.00 ROUTINE GENERAL MEDICAL EXAMINATION AT A HEALTH CARE FACILITY: Primary | ICD-10-CM

## 2024-12-12 DIAGNOSIS — E03.8 SUBCLINICAL HYPOTHYROIDISM: ICD-10-CM

## 2024-12-12 DIAGNOSIS — M54.42 CHRONIC LEFT-SIDED LOW BACK PAIN WITH LEFT-SIDED SCIATICA: ICD-10-CM

## 2024-12-12 DIAGNOSIS — G89.29 CHRONIC LEFT-SIDED LOW BACK PAIN WITH LEFT-SIDED SCIATICA: ICD-10-CM

## 2024-12-12 DIAGNOSIS — G89.4 CHRONIC PAIN SYNDROME: ICD-10-CM

## 2024-12-12 DIAGNOSIS — F33.0 MAJOR DEPRESSIVE DISORDER, RECURRENT, MILD (H): ICD-10-CM

## 2024-12-12 DIAGNOSIS — E78.5 HYPERLIPIDEMIA LDL GOAL <100: ICD-10-CM

## 2024-12-12 DIAGNOSIS — F33.1 MAJOR DEPRESSIVE DISORDER, RECURRENT EPISODE, MODERATE (H): ICD-10-CM

## 2024-12-12 DIAGNOSIS — R87.610 ASCUS WITH POSITIVE HIGH RISK HPV CERVICAL: ICD-10-CM

## 2024-12-12 DIAGNOSIS — E11.9 TYPE 2 DIABETES MELLITUS WITHOUT COMPLICATION, WITHOUT LONG-TERM CURRENT USE OF INSULIN (H): ICD-10-CM

## 2024-12-12 DIAGNOSIS — R87.810 ASCUS WITH POSITIVE HIGH RISK HPV CERVICAL: ICD-10-CM

## 2024-12-12 LAB
CHOLEST SERPL-MCNC: 183 MG/DL
CREAT UR-MCNC: 90 MG/DL
CREAT UR-MCNC: 90.3 MG/DL
EST. AVERAGE GLUCOSE BLD GHB EST-MCNC: 111 MG/DL
FASTING STATUS PATIENT QL REPORTED: NO
HBA1C MFR BLD: 5.5 % (ref 0–5.6)
HDLC SERPL-MCNC: 58 MG/DL
LDLC SERPL CALC-MCNC: 102 MG/DL
MICROALBUMIN UR-MCNC: <12 MG/L
MICROALBUMIN/CREAT UR: NORMAL MG/G{CREAT}
NONHDLC SERPL-MCNC: 125 MG/DL
T4 FREE SERPL-MCNC: 1.16 NG/DL (ref 0.9–1.7)
TRIGL SERPL-MCNC: 113 MG/DL
TSH SERPL DL<=0.005 MIU/L-ACNC: 5.46 UIU/ML (ref 0.3–4.2)

## 2024-12-12 RX ORDER — BUPROPION HYDROCHLORIDE 150 MG/1
TABLET, EXTENDED RELEASE ORAL
Qty: 270 TABLET | Refills: 3 | Status: SHIPPED | OUTPATIENT
Start: 2024-12-12

## 2024-12-12 RX ORDER — HYDROCODONE BITARTRATE AND ACETAMINOPHEN 5; 325 MG/1; MG/1
1 TABLET ORAL EVERY 6 HOURS PRN
Qty: 25 TABLET | Refills: 0 | Status: SHIPPED | OUTPATIENT
Start: 2024-12-12

## 2024-12-12 RX ORDER — GABAPENTIN 100 MG/1
100-200 CAPSULE ORAL AT BEDTIME
Qty: 180 CAPSULE | Refills: 3 | Status: SHIPPED | OUTPATIENT
Start: 2024-12-12

## 2024-12-12 RX ORDER — ATORVASTATIN CALCIUM 20 MG/1
20 TABLET, FILM COATED ORAL DAILY
Qty: 90 TABLET | Refills: 3 | Status: SHIPPED | OUTPATIENT
Start: 2024-12-12

## 2024-12-12 ASSESSMENT — PAIN SCALES - GENERAL: PAINLEVEL_OUTOF10: SEVERE PAIN (6)

## 2024-12-12 NOTE — PATIENT INSTRUCTIONS
Patient Education   Preventive Care Advice   This is general advice given by our system to help you stay healthy. However, your care team may have specific advice just for you. Please talk to your care team about your preventive care needs.  Nutrition  Eat 5 or more servings of fruits and vegetables each day.  Try wheat bread, brown rice and whole grain pasta (instead of white bread, rice, and pasta).  Get enough calcium and vitamin D. Check the label on foods and aim for 100% of the RDA (recommended daily allowance).  Lifestyle  Exercise at least 150 minutes each week  (30 minutes a day, 5 days a week).  Do muscle strengthening activities 2 days a week. These help control your weight and prevent disease.  No smoking.  Wear sunscreen to prevent skin cancer.  Have a dental exam and cleaning every 6 months.  Yearly exams  See your health care team every year to talk about:  Any changes in your health.  Any medicines your care team has prescribed.  Preventive care, family planning, and ways to prevent chronic diseases.  Shots (vaccines)   HPV shots (up to age 26), if you've never had them before.  Hepatitis B shots (up to age 59), if you've never had them before.  COVID-19 shot: Get this shot when it's due.  Flu shot: Get a flu shot every year.  Tetanus shot: Get a tetanus shot every 10 years.  Pneumococcal, hepatitis A, and RSV shots: Ask your care team if you need these based on your risk.  Shingles shot (for age 50 and up)  General health tests  Diabetes screening:  Starting at age 35, Get screened for diabetes at least every 3 years.  If you are younger than age 35, ask your care team if you should be screened for diabetes.  Cholesterol test: At age 39, start having a cholesterol test every 5 years, or more often if advised.  Bone density scan (DEXA): At age 50, ask your care team if you should have this scan for osteoporosis (brittle bones).  Hepatitis C: Get tested at least once in your life.  STIs (sexually  transmitted infections)  Before age 24: Ask your care team if you should be screened for STIs.  After age 24: Get screened for STIs if you're at risk. You are at risk for STIs (including HIV) if:  You are sexually active with more than one person.  You don't use condoms every time.  You or a partner was diagnosed with a sexually transmitted infection.  If you are at risk for HIV, ask about PrEP medicine to prevent HIV.  Get tested for HIV at least once in your life, whether you are at risk for HIV or not.  Cancer screening tests  Cervical cancer screening: If you have a cervix, begin getting regular cervical cancer screening tests starting at age 21.  Breast cancer scan (mammogram): If you've ever had breasts, begin having regular mammograms starting at age 40. This is a scan to check for breast cancer.  Colon cancer screening: It is important to start screening for colon cancer at age 45.  Have a colonoscopy test every 10 years (or more often if you're at risk) Or, ask your provider about stool tests like a FIT test every year or Cologuard test every 3 years.  To learn more about your testing options, visit:   .  For help making a decision, visit:   https://bit.ly/ep04037.  Prostate cancer screening test: If you have a prostate, ask your care team if a prostate cancer screening test (PSA) at age 55 is right for you.  Lung cancer screening: If you are a current or former smoker ages 50 to 80, ask your care team if ongoing lung cancer screenings are right for you.  For informational purposes only. Not to replace the advice of your health care provider. Copyright   2023 [employer]. All rights reserved. Clinically reviewed by the [facility]. [employer] [id number] - REV 01/24.

## 2024-12-12 NOTE — PROGRESS NOTES
Preventive Care Visit  [facility]  Rebecca Carcamo MD, Family Medicine  Dec 12, 2024      Assessment & Plan     Routine general medical examination at a health care facility  Patient is a very pleasant 58-year-old female who presents today for annual visit.  Feels well, she is up-to-date on screenings.  She will plan to complete a colonoscopy through an outside group, will be due for her mammogram early next year, and will again complete that through an outside group.  Will await those results.    Type 2 diabetes mellitus without complication, without long-term current use of insulin (H)  For diabetes, occasionally having symptomatic hypoglycemia, which she easily mitigates.  We discussed dropping the Ozempic dose versus keeping it the same.  She is having some decreased appetite suppression the longer that she is on Ozempic and wishes to continue at the current dose, which is reasonable and we will continue as such.  Labs obtained today.  - HEMOGLOBIN A1C  - Lipid panel reflex to direct LDL Fasting  - Albumin Random Urine Quantitative with Creat Ratio  - HEMOGLOBIN A1C  - Lipid panel reflex to direct LDL Fasting  - Albumin Random Urine Quantitative with Creat Ratio  - FOOT EXAM     Subclinical hypothyroidism  History of mildly elevated TSH with normal T4, will recheck today.  - TSH with free T4 reflex  - TSH with free T4 reflex    ASCUS with positive high risk HPV cervical  History of ASCUS, not due now for Pap until 2027    Major depressive disorder, recurrent episode, moderate (H)  Refill  - buPROPion (WELLBUTRIN SR) 150 MG 12 hr tablet  Dispense: 270 tablet; Refill: 3    Hyperlipidemia LDL goal <100  Refill  - atorvastatin (LIPITOR) 20 MG tablet  Dispense: 90 tablet; Refill: 3    Major depressive disorder, recurrent, mild (H)  Refill  - gabapentin (NEURONTIN) 100 MG capsule  Dispense: 180 capsule; Refill: 3    Chronic pain syndrome  Chronic left-sided low back pain with left-sided  "sciatica  Refill, UDS completed today.  Has had some increasing back pain, seeing outside spine specialist for this.  - [id number] - Urine Drug Confirmation Panel (Comprehensive)  - HYDROcodone-acetaminophen (NORCO) 5-325 MG tablet  Dispense: 25 tablet; Refill: 0  - [id number] - Urine Drug Confirmation Panel (Comprehensive)      Patient has been advised of split billing requirements and indicates understanding: Yes        BMI  Estimated body mass index is 41.77 kg/m  as calculated from the following:    Height as of this encounter: 1.765 m (5' 9.5\").    Weight as of this encounter: 130.2 kg (287 lb).     Counseling  Appropriate preventive services were addressed with this patient via screening, questionnaire, or discussion as appropriate for fall prevention, nutrition, physical activity, Tobacco-use cessation, social engagement, weight loss and cognition.  Checklist reviewing preventive services available has been given to the patient.  Reviewed patient's diet, addressing concerns and/or questions.       Mitch Bliss is a 58 year old, presenting for the following:  Physical and Diabetes        12/12/2024     2:24 PM   Additional Questions   Roomed by Jennifer CEBALLOS   Accompanied by Self          HPI  Diabetes Follow-up    How often are you checking your blood sugar? A few times a month  What time of day are you checking your blood sugars (select all that apply)?   When she feels its low  Have you had any blood sugars above 200?  No  Have you had any blood sugars below 70?  Yes   What symptoms do you notice when your blood sugar is low?  Shaky and Dizzy  What concerns do you have today about your diabetes? None   Do you have any of these symptoms? (Select all that apply)  Numbness in feet    Only check when feels like it is low, and it will be lower then.   Eat something then.  Once a month.        Started PT, some of those are hurting the back.   See them again on the 20th.       BP Readings from Last 2 Encounters: "   12/12/24 120/82   06/28/24 114/80     Hemoglobin A1C (%)   Date Value   12/12/2024 5.5   06/28/2024 5.5     LDL Cholesterol Calculated (mg/dL)   Date Value   12/29/2023 84   10/09/2023 170 (H)           Health Care Directive  Patient does not have a Health Care Directive: Discussed advance care planning with patient; information given to patient to review.      12/7/2024   General Health   How would you rate your overall physical health? Good   Feel stress (tense, anxious, or unable to sleep) Not at all            12/7/2024   Nutrition   Three or more servings of calcium each day? (!) NO   Diet: Regular (no restrictions)   How many servings of fruit and vegetables per day? (!) 0-1   How many sweetened beverages each day? 0-1            12/7/2024   Exercise   Days per week of moderate/strenous exercise 0 days   Average minutes spent exercising at this level 0 min      (!) EXERCISE CONCERN      12/7/2024   Social Factors   Frequency of gathering with friends or relatives Once a week   Worry food won't last until get money to buy more No   Food not last or not have enough money for food? No   Do you have housing? (Housing is defined as stable permanent housing and does not include staying ouside in a car, in a tent, in an abandoned building, in an overnight shelter, or couch-surfing.) Yes   Are you worried about losing your housing? No   Lack of transportation? No   Unable to get utilities (heat,electricity)? No            12/7/2024   Fall Risk   Fallen 2 or more times in the past year? No    Trouble with walking or balance? No        Patient-reported          12/7/2024   Dental   Dentist two times every year? Yes            12/7/2024   TB Screening   Were you born outside of the [place]? No            Today's PHQ-9 Score:       6/28/2024    12:39 PM   PHQ-9 SCORE   PHQ-9 Total Score MyChart 1 (Minimal depression)   PHQ-9 Total Score 1           12/7/2024   Substance Use   Alcohol more than 3/day or more than 7/wk Not  Applicable   Do you use any other substances recreationally? No        Social History     Tobacco Use    Smoking status: Former     Current packs/day: 0.00     Average packs/day: 0.5 packs/day for 8.4 years (4.2 ttl pk-yrs)     Types: Cigarettes     Start date: 1982     Quit date: 1990     Years since quittin.9     Passive exposure: Never    Smokeless tobacco: Never   Vaping Use    Vaping status: Never Used   Substance Use Topics    Alcohol use: Yes     Comment: I rarely drink    Drug use: Not Currently     Types: Cocaine, Marijuana           2022   LAST [facility]-7 RESULTS   1st degree relative breast or ovarian cancer No    Any relative bilateral breast cancer No    Any male have breast cancer No    Any ONE woman have BOTH breast AND ovarian cancer No    Any woman with breast cancer before 50yrs No    2 or more relatives with breast AND/OR ovarian cancer No    2 or more relatives with breast AND/OR bowel cancer No        Patient-reported        Mammogram Screening - Mammogram every 1-2 years updated in Health Maintenance based on mutual decision making        2024   STI Screening   New sexual partner(s) since last STI/HIV test? No        History of abnormal Pap smear: YES - reflected in Problem List and Health Maintenance accordingly        2022    12:00 PM   PAP / HPV   PAP-ABSTRACT See Scanned Document           This result is from an external source.     ASCVD Risk   The 10-year ASCVD risk score (Michael SCHAFER, et al., 2019) is: 3.7%    Values used to calculate the score:      Age: 58 years      Sex: Female      Is Non- : No      Diabetic: Yes      Tobacco smoker: No      Systolic Blood Pressure: 120 mmHg      Is BP treated: No      HDL Cholesterol: 59 mg/dL      Total Cholesterol: 166 mg/dL         Reviewed and updated as needed this visit by Provider                        Review of Systems  Constitutional, HEENT, cardiovascular, pulmonary, gi and gu  "systems are negative, except as otherwise noted.     Objective    Exam  /82 (BP Location: Right arm, Patient Position: Sitting, Cuff Size: Adult Large)   Pulse 92   Resp 14   Ht 1.765 m (5' 9.5\")   Wt 130.2 kg (287 lb)   LMP  (LMP Unknown)   SpO2 97%   BMI 41.77 kg/m     Estimated body mass index is 41.77 kg/m  as calculated from the following:    Height as of this encounter: 1.765 m (5' 9.5\").    Weight as of this encounter: 130.2 kg (287 lb).    Physical Exam  GENERAL: alert and no distress  EYES: Eyes grossly normal to inspection, and conjunctivae and sclerae normal  HENT: ear canals and TM's normal, nose and mouth without ulcers or lesions  NECK: no adenopathy, no asymmetry, masses, or scars  RESP: lungs clear to auscultation - no rales, rhonchi or wheezes  CV: regular rate and rhythm, normal S1 S2, no S3 or S4, no murmur, click or rub, no peripheral edema  ABDOMEN: soft, nontender, no hepatosplenomegaly, no masses and bowel sounds normal  MS: no gross musculoskeletal defects noted, no edema  SKIN: no suspicious lesions or rashes  NEURO: Normal strength and tone, mentation intact and speech normal  PSYCH: mentation appears normal, affect normal/bright    Diabetic Foot Screen:  Any complaints of increased pain or numbness ? No  Is there a foot ulcer now or a history of foot ulcer? No  Does the foot have an abnormal shape? No  Are the nails thick, too long or ingrown? No  Are there any redness or open areas? No         Sensation Testing done at all points on the diagram with monofilament     Right Foot: Sensation Absent at the following points , 1, 2, and 4  Left Foot: Sensation Absent at the following points , 1, 2, 4, and 5     Risk Category: 1- Loss of protective sensation with normal appearing foot (no weakness, deformity, callous, pre-ulcer or h/o ulceration)  Performed by Rebecca Carcamo MD      Signed Electronically by: Rebecca Carcamo MD    "

## 2024-12-17 LAB
DHC UR CFM-MCNC: 50 NG/ML
DHC/CREAT UR: 56 NG/MG {CREAT}
GABAPENTIN UR QL CFM: PRESENT
HYDROCODONE UR CFM-MCNC: 222 NG/ML
HYDROCODONE/CREAT UR: 247 NG/MG {CREAT}

## 2025-01-07 ENCOUNTER — DOCUMENTATION ONLY (OUTPATIENT)
Dept: OTHER | Facility: CLINIC | Age: 60
End: 2025-01-07
Payer: COMMERCIAL

## 2025-02-03 ENCOUNTER — MYC MEDICAL ADVICE (OUTPATIENT)
Dept: FAMILY MEDICINE | Facility: CLINIC | Age: 60
End: 2025-02-03
Payer: COMMERCIAL

## 2025-02-03 DIAGNOSIS — E11.9 TYPE 2 DIABETES MELLITUS WITHOUT COMPLICATION, WITHOUT LONG-TERM CURRENT USE OF INSULIN (H): Primary | ICD-10-CM

## 2025-02-05 ENCOUNTER — MYC REFILL (OUTPATIENT)
Dept: FAMILY MEDICINE | Facility: CLINIC | Age: 60
End: 2025-02-05
Payer: COMMERCIAL

## 2025-02-05 DIAGNOSIS — M54.42 CHRONIC LEFT-SIDED LOW BACK PAIN WITH LEFT-SIDED SCIATICA: ICD-10-CM

## 2025-02-05 DIAGNOSIS — G89.29 CHRONIC LEFT-SIDED LOW BACK PAIN WITH LEFT-SIDED SCIATICA: ICD-10-CM

## 2025-02-05 DIAGNOSIS — G89.4 CHRONIC PAIN SYNDROME: ICD-10-CM

## 2025-02-06 RX ORDER — HYDROCODONE BITARTRATE AND ACETAMINOPHEN 5; 325 MG/1; MG/1
1 TABLET ORAL EVERY 6 HOURS PRN
Qty: 25 TABLET | Refills: 0 | Status: SHIPPED | OUTPATIENT
Start: 2025-02-06

## 2025-02-15 DIAGNOSIS — M54.41 CHRONIC BILATERAL LOW BACK PAIN WITH BILATERAL SCIATICA: ICD-10-CM

## 2025-02-15 DIAGNOSIS — G89.29 CHRONIC BILATERAL LOW BACK PAIN WITH BILATERAL SCIATICA: ICD-10-CM

## 2025-02-15 DIAGNOSIS — M54.42 CHRONIC BILATERAL LOW BACK PAIN WITH BILATERAL SCIATICA: ICD-10-CM

## 2025-02-15 DIAGNOSIS — G89.4 CHRONIC PAIN SYNDROME: ICD-10-CM

## 2025-02-17 RX ORDER — CYCLOBENZAPRINE HCL 10 MG
10 TABLET ORAL 3 TIMES DAILY
Qty: 270 TABLET | Refills: 0 | Status: SHIPPED | OUTPATIENT
Start: 2025-02-17

## 2025-03-10 ENCOUNTER — MYC REFILL (OUTPATIENT)
Dept: FAMILY MEDICINE | Facility: CLINIC | Age: 60
End: 2025-03-10
Payer: COMMERCIAL

## 2025-03-10 ENCOUNTER — MYC MEDICAL ADVICE (OUTPATIENT)
Dept: FAMILY MEDICINE | Facility: CLINIC | Age: 60
End: 2025-03-10
Payer: COMMERCIAL

## 2025-03-10 DIAGNOSIS — G43.009 MIGRAINE WITHOUT AURA AND WITHOUT STATUS MIGRAINOSUS, NOT INTRACTABLE: Primary | ICD-10-CM

## 2025-03-10 RX ORDER — RIZATRIPTAN BENZOATE 10 MG/1
TABLET ORAL
OUTPATIENT
Start: 2025-03-10

## 2025-03-10 NOTE — TELEPHONE ENCOUNTER
"See My chart message. Asking if you will order Maxalt for her?    Maxalt is listed as a \"patient reported\" medication per med list review.     Looks like Dr. Tye Parnell is a Neurologist at [facility] per Care everywhere. Last seen in Neurology on 5-3-2023 per Care everywhere.     Unable to read dictation from these visits.     Please advise if you can order Maxalt? Or do you want to see patient to discuss use?    Loan Craven RN  "

## 2025-03-11 RX ORDER — RIZATRIPTAN BENZOATE 10 MG/1
10 TABLET ORAL
Qty: 10 TABLET | Refills: 3 | Status: SHIPPED | OUTPATIENT
Start: 2025-03-11

## 2025-05-04 ENCOUNTER — MYC REFILL (OUTPATIENT)
Dept: FAMILY MEDICINE | Facility: CLINIC | Age: 60
End: 2025-05-04
Payer: COMMERCIAL

## 2025-05-04 DIAGNOSIS — M54.42 CHRONIC LEFT-SIDED LOW BACK PAIN WITH LEFT-SIDED SCIATICA: ICD-10-CM

## 2025-05-04 DIAGNOSIS — G89.29 CHRONIC LEFT-SIDED LOW BACK PAIN WITH LEFT-SIDED SCIATICA: ICD-10-CM

## 2025-05-04 DIAGNOSIS — G89.4 CHRONIC PAIN SYNDROME: ICD-10-CM

## 2025-05-05 RX ORDER — HYDROCODONE BITARTRATE AND ACETAMINOPHEN 5; 325 MG/1; MG/1
1 TABLET ORAL EVERY 6 HOURS PRN
Qty: 25 TABLET | Refills: 0 | Status: SHIPPED | OUTPATIENT
Start: 2025-05-05

## 2025-05-29 DIAGNOSIS — E11.29 TYPE 2 DIABETES MELLITUS WITH OTHER DIABETIC KIDNEY COMPLICATION (H): ICD-10-CM

## 2025-05-29 DIAGNOSIS — E66.01 MORBID (SEVERE) OBESITY DUE TO EXCESS CALORIES (H): ICD-10-CM

## 2025-05-29 RX ORDER — SEMAGLUTIDE 1.34 MG/ML
INJECTION, SOLUTION SUBCUTANEOUS
Qty: 3 ML | Refills: 2 | Status: SHIPPED | OUTPATIENT
Start: 2025-05-29

## 2025-06-02 DIAGNOSIS — M54.42 CHRONIC BILATERAL LOW BACK PAIN WITH BILATERAL SCIATICA: ICD-10-CM

## 2025-06-02 DIAGNOSIS — M54.41 CHRONIC BILATERAL LOW BACK PAIN WITH BILATERAL SCIATICA: ICD-10-CM

## 2025-06-02 DIAGNOSIS — G89.29 CHRONIC BILATERAL LOW BACK PAIN WITH BILATERAL SCIATICA: ICD-10-CM

## 2025-06-02 DIAGNOSIS — G89.4 CHRONIC PAIN SYNDROME: ICD-10-CM

## 2025-06-02 RX ORDER — CYCLOBENZAPRINE HCL 10 MG
10 TABLET ORAL 3 TIMES DAILY
Qty: 270 TABLET | Refills: 0 | Status: SHIPPED | OUTPATIENT
Start: 2025-06-02

## 2025-06-10 ENCOUNTER — RESULTS FOLLOW-UP (OUTPATIENT)
Dept: FAMILY MEDICINE | Facility: CLINIC | Age: 60
End: 2025-06-10

## 2025-06-10 ENCOUNTER — LAB (OUTPATIENT)
Dept: LAB | Facility: CLINIC | Age: 60
End: 2025-06-10
Payer: COMMERCIAL

## 2025-06-10 DIAGNOSIS — M54.42 CHRONIC LEFT-SIDED LOW BACK PAIN WITH LEFT-SIDED SCIATICA: ICD-10-CM

## 2025-06-10 DIAGNOSIS — G89.4 CHRONIC PAIN SYNDROME: ICD-10-CM

## 2025-06-10 DIAGNOSIS — G89.29 CHRONIC LEFT-SIDED LOW BACK PAIN WITH LEFT-SIDED SCIATICA: ICD-10-CM

## 2025-06-10 DIAGNOSIS — E11.9 TYPE 2 DIABETES MELLITUS WITHOUT COMPLICATION, WITHOUT LONG-TERM CURRENT USE OF INSULIN (H): Primary | ICD-10-CM

## 2025-06-10 LAB
ANION GAP SERPL CALCULATED.3IONS-SCNC: 13 MMOL/L (ref 7–15)
BUN SERPL-MCNC: 11.1 MG/DL (ref 8–23)
CALCIUM SERPL-MCNC: 9.7 MG/DL (ref 8.8–10.4)
CHLORIDE SERPL-SCNC: 102 MMOL/L (ref 98–107)
CREAT SERPL-MCNC: 1.2 MG/DL (ref 0.51–0.95)
EGFRCR SERPLBLD CKD-EPI 2021: 52 ML/MIN/1.73M2
EST. AVERAGE GLUCOSE BLD GHB EST-MCNC: 108 MG/DL
GLUCOSE SERPL-MCNC: 103 MG/DL (ref 70–99)
HBA1C MFR BLD: 5.4 % (ref 0–5.6)
HCO3 SERPL-SCNC: 26 MMOL/L (ref 22–29)
POTASSIUM SERPL-SCNC: 4.6 MMOL/L (ref 3.4–5.3)
SODIUM SERPL-SCNC: 141 MMOL/L (ref 135–145)

## 2025-06-10 PROCEDURE — 36415 COLL VENOUS BLD VENIPUNCTURE: CPT

## 2025-06-10 PROCEDURE — 83036 HEMOGLOBIN GLYCOSYLATED A1C: CPT

## 2025-06-10 PROCEDURE — 80048 BASIC METABOLIC PNL TOTAL CA: CPT

## 2025-06-11 RX ORDER — HYDROCODONE BITARTRATE AND ACETAMINOPHEN 5; 325 MG/1; MG/1
1 TABLET ORAL EVERY 6 HOURS PRN
Qty: 25 TABLET | Refills: 0 | Status: SHIPPED | OUTPATIENT
Start: 2025-06-11

## 2025-07-03 DIAGNOSIS — L70.0 ACNE VULGARIS: ICD-10-CM

## 2025-07-07 RX ORDER — CLINDAMYCIN PHOSPHATE 11.9 MG/ML
SOLUTION TOPICAL 2 TIMES DAILY PRN
Qty: 60 ML | Refills: 3 | Status: SHIPPED | OUTPATIENT
Start: 2025-07-07

## 2025-07-22 ENCOUNTER — MYC REFILL (OUTPATIENT)
Dept: FAMILY MEDICINE | Facility: CLINIC | Age: 60
End: 2025-07-22

## 2025-07-22 DIAGNOSIS — G89.4 CHRONIC PAIN SYNDROME: ICD-10-CM

## 2025-07-22 DIAGNOSIS — G89.29 CHRONIC LEFT-SIDED LOW BACK PAIN WITH LEFT-SIDED SCIATICA: ICD-10-CM

## 2025-07-22 DIAGNOSIS — M54.42 CHRONIC LEFT-SIDED LOW BACK PAIN WITH LEFT-SIDED SCIATICA: ICD-10-CM

## 2025-07-22 RX ORDER — HYDROCODONE BITARTRATE AND ACETAMINOPHEN 5; 325 MG/1; MG/1
1 TABLET ORAL EVERY 6 HOURS PRN
Qty: 25 TABLET | Refills: 0 | Status: SHIPPED | OUTPATIENT
Start: 2025-07-22

## 2025-08-12 DIAGNOSIS — E11.29 TYPE 2 DIABETES MELLITUS WITH OTHER DIABETIC KIDNEY COMPLICATION (H): ICD-10-CM

## 2025-08-12 DIAGNOSIS — E66.01 MORBID (SEVERE) OBESITY DUE TO EXCESS CALORIES (H): ICD-10-CM

## 2025-08-12 RX ORDER — SEMAGLUTIDE 1.34 MG/ML
INJECTION, SOLUTION SUBCUTANEOUS
Qty: 3 ML | Refills: 2 | Status: SHIPPED | OUTPATIENT
Start: 2025-08-12

## 2025-09-03 DIAGNOSIS — G89.29 CHRONIC BILATERAL LOW BACK PAIN WITH BILATERAL SCIATICA: ICD-10-CM

## 2025-09-03 DIAGNOSIS — M54.42 CHRONIC BILATERAL LOW BACK PAIN WITH BILATERAL SCIATICA: ICD-10-CM

## 2025-09-03 DIAGNOSIS — G89.4 CHRONIC PAIN SYNDROME: ICD-10-CM

## 2025-09-03 DIAGNOSIS — M54.41 CHRONIC BILATERAL LOW BACK PAIN WITH BILATERAL SCIATICA: ICD-10-CM

## 2025-09-03 RX ORDER — CYCLOBENZAPRINE HCL 10 MG
10 TABLET ORAL 3 TIMES DAILY
Qty: 270 TABLET | Refills: 0 | Status: SHIPPED | OUTPATIENT
Start: 2025-09-03